# Patient Record
Sex: MALE | Race: WHITE | NOT HISPANIC OR LATINO | Employment: OTHER | URBAN - METROPOLITAN AREA
[De-identification: names, ages, dates, MRNs, and addresses within clinical notes are randomized per-mention and may not be internally consistent; named-entity substitution may affect disease eponyms.]

---

## 2023-03-26 PROBLEM — R22.9 SKIN NODULE: Status: ACTIVE | Noted: 2023-03-26

## 2023-03-26 PROBLEM — I48.91 AFIB (MULTI): Status: ACTIVE | Noted: 2023-03-26

## 2023-03-26 PROBLEM — I10 HYPERTENSION: Status: ACTIVE | Noted: 2023-03-26

## 2023-03-26 PROBLEM — M54.2 NECK PAIN: Status: ACTIVE | Noted: 2023-03-26

## 2023-03-26 PROBLEM — R73.03 PREDIABETES: Status: ACTIVE | Noted: 2023-03-26

## 2023-03-26 PROBLEM — G89.29 CHRONIC BACK PAIN: Status: ACTIVE | Noted: 2023-03-26

## 2023-03-26 PROBLEM — R20.2 NUMBNESS AND TINGLING: Status: ACTIVE | Noted: 2023-03-26

## 2023-03-26 PROBLEM — R91.8 LUNG NODULE, MULTIPLE: Status: ACTIVE | Noted: 2023-03-26

## 2023-03-26 PROBLEM — I71.20 THORACIC AORTIC ANEURYSM WITHOUT RUPTURE (CMS-HCC): Status: ACTIVE | Noted: 2023-03-26

## 2023-03-26 PROBLEM — F17.201 TOBACCO DEPENDENCE IN REMISSION: Status: ACTIVE | Noted: 2023-03-26

## 2023-03-26 PROBLEM — R20.0 NUMBNESS AND TINGLING: Status: ACTIVE | Noted: 2023-03-26

## 2023-03-26 PROBLEM — E55.9 VITAMIN D DEFICIENCY: Status: ACTIVE | Noted: 2023-03-26

## 2023-03-26 PROBLEM — N40.0 PROSTATIC HYPERTROPHY: Status: ACTIVE | Noted: 2023-03-26

## 2023-03-26 PROBLEM — M54.9 CHRONIC BACK PAIN: Status: ACTIVE | Noted: 2023-03-26

## 2023-03-26 PROBLEM — E78.5 HLD (HYPERLIPIDEMIA): Status: ACTIVE | Noted: 2023-03-26

## 2023-03-26 PROBLEM — Z86.73 HISTORY OF TIA (TRANSIENT ISCHEMIC ATTACK): Status: ACTIVE | Noted: 2023-03-26

## 2023-03-26 PROBLEM — B02.9 SHINGLES: Status: ACTIVE | Noted: 2023-03-26

## 2023-03-26 PROBLEM — E04.1 THYROID NODULE: Status: ACTIVE | Noted: 2023-03-26

## 2023-03-26 PROBLEM — B07.9 VIRAL WARTS: Status: ACTIVE | Noted: 2023-03-26

## 2023-03-26 RX ORDER — METOPROLOL TARTRATE 50 MG/1
1 TABLET ORAL 2 TIMES DAILY
COMMUNITY
Start: 2022-02-14 | End: 2024-03-11 | Stop reason: SDUPTHER

## 2023-03-26 RX ORDER — ASPIRIN 81 MG/1
1 TABLET ORAL DAILY
COMMUNITY
Start: 2017-09-19

## 2023-03-26 RX ORDER — OLMESARTAN MEDOXOMIL AND HYDROCHLOROTHIAZIDE 40/25 40; 25 MG/1; MG/1
1 TABLET ORAL DAILY
COMMUNITY
Start: 2022-03-09 | End: 2023-10-02 | Stop reason: SDUPTHER

## 2023-03-26 RX ORDER — SIMVASTATIN 40 MG/1
1 TABLET, FILM COATED ORAL NIGHTLY
COMMUNITY
Start: 2017-09-19 | End: 2024-03-11 | Stop reason: SDUPTHER

## 2023-03-31 ENCOUNTER — OFFICE VISIT (OUTPATIENT)
Dept: PRIMARY CARE | Facility: CLINIC | Age: 68
End: 2023-03-31
Payer: MEDICARE

## 2023-03-31 VITALS
HEIGHT: 71 IN | WEIGHT: 231 LBS | SYSTOLIC BLOOD PRESSURE: 120 MMHG | HEART RATE: 96 BPM | BODY MASS INDEX: 32.34 KG/M2 | DIASTOLIC BLOOD PRESSURE: 86 MMHG | OXYGEN SATURATION: 96 %

## 2023-03-31 DIAGNOSIS — I10 HYPERTENSION, UNSPECIFIED TYPE: ICD-10-CM

## 2023-03-31 DIAGNOSIS — E55.9 VITAMIN D DEFICIENCY: ICD-10-CM

## 2023-03-31 DIAGNOSIS — R73.03 PREDIABETES: ICD-10-CM

## 2023-03-31 DIAGNOSIS — I48.91 ATRIAL FIBRILLATION, UNSPECIFIED TYPE (MULTI): Primary | ICD-10-CM

## 2023-03-31 DIAGNOSIS — E04.1 THYROID NODULE: ICD-10-CM

## 2023-03-31 DIAGNOSIS — E78.5 HYPERLIPIDEMIA, UNSPECIFIED HYPERLIPIDEMIA TYPE: ICD-10-CM

## 2023-03-31 DIAGNOSIS — Z12.5 ENCOUNTER FOR SCREENING FOR MALIGNANT NEOPLASM OF PROSTATE: ICD-10-CM

## 2023-03-31 PROCEDURE — 3074F SYST BP LT 130 MM HG: CPT | Performed by: NURSE PRACTITIONER

## 2023-03-31 PROCEDURE — 99387 INIT PM E/M NEW PAT 65+ YRS: CPT | Performed by: NURSE PRACTITIONER

## 2023-03-31 PROCEDURE — 3079F DIAST BP 80-89 MM HG: CPT | Performed by: NURSE PRACTITIONER

## 2023-03-31 PROCEDURE — 1036F TOBACCO NON-USER: CPT | Performed by: NURSE PRACTITIONER

## 2023-03-31 PROCEDURE — 1159F MED LIST DOCD IN RCRD: CPT | Performed by: NURSE PRACTITIONER

## 2023-03-31 ASSESSMENT — ENCOUNTER SYMPTOMS
HEADACHES: 0
SHORTNESS OF BREATH: 0
FATIGUE: 0
SORE THROAT: 0
ABDOMINAL PAIN: 0
DIZZINESS: 0
CHILLS: 0
DIARRHEA: 0
VOMITING: 0
NUMBNESS: 0
FEVER: 0
CONSTIPATION: 0
MUSCULOSKELETAL NEGATIVE: 1
PALPITATIONS: 0
COUGH: 0
PSYCHIATRIC NEGATIVE: 1
NAUSEA: 0
WEAKNESS: 0

## 2023-03-31 ASSESSMENT — PATIENT HEALTH QUESTIONNAIRE - PHQ9
2. FEELING DOWN, DEPRESSED OR HOPELESS: NOT AT ALL
1. LITTLE INTEREST OR PLEASURE IN DOING THINGS: NOT AT ALL
SUM OF ALL RESPONSES TO PHQ9 QUESTIONS 1 AND 2: 0

## 2023-03-31 NOTE — PROGRESS NOTES
"Subjective   Patient ID: Raheem Washington is a 67 y.o. male who presents to establish care as a new patient.    HPI   Patient here today to establish care with new provider   has 3 grown children and grandchildren  Cares for her 91-year-old mother  Remains active, swimming, shooting guns, dirt bikes..  Has a history of A-fib that is remained stable and he follows up with cardiology regularly.  Blood pressure is stable  Takes medications as prescribed  Denies chest pain, SOB, palpitations, dizziness,  or GI issues.        Review of Systems   Constitutional:  Negative for chills, fatigue and fever.   HENT:  Negative for congestion, ear pain and sore throat.    Eyes:  Negative for visual disturbance.   Respiratory:  Negative for cough and shortness of breath.    Cardiovascular:  Negative for chest pain, palpitations and leg swelling.   Gastrointestinal:  Negative for abdominal pain, constipation, diarrhea, nausea and vomiting.   Genitourinary: Negative.    Musculoskeletal: Negative.    Skin:  Negative for rash.   Neurological:  Negative for dizziness, weakness, numbness and headaches.   Psychiatric/Behavioral: Negative.         Objective   /86   Pulse 96   Ht 1.803 m (5' 11\")   Wt 105 kg (231 lb)   SpO2 96%   BMI 32.22 kg/m²     Physical Exam  Constitutional:       General: He is not in acute distress.     Appearance: Normal appearance.   HENT:      Head: Normocephalic and atraumatic.      Right Ear: Tympanic membrane, ear canal and external ear normal.      Left Ear: Tympanic membrane, ear canal and external ear normal.      Nose: Nose normal.      Mouth/Throat:      Mouth: Mucous membranes are moist.      Pharynx: Oropharynx is clear.   Eyes:      Extraocular Movements: Extraocular movements intact.      Conjunctiva/sclera: Conjunctivae normal.      Pupils: Pupils are equal, round, and reactive to light.   Cardiovascular:      Rate and Rhythm: Normal rate and regular rhythm.      Pulses: Normal " pulses.      Heart sounds: Normal heart sounds. No murmur heard.  Pulmonary:      Effort: Pulmonary effort is normal.      Breath sounds: Normal breath sounds. No wheezing, rhonchi or rales.   Abdominal:      General: Bowel sounds are normal.      Palpations: Abdomen is soft.      Tenderness: There is no abdominal tenderness.   Musculoskeletal:         General: Normal range of motion.      Cervical back: Normal range of motion and neck supple.   Lymphadenopathy:      Comments: No lymphadenopathy noted   Skin:     General: Skin is warm and dry.      Findings: No rash.   Neurological:      General: No focal deficit present.      Mental Status: He is alert and oriented to person, place, and time.      Cranial Nerves: No cranial nerve deficit.      Coordination: Coordination normal.      Gait: Gait normal.   Psychiatric:         Mood and Affect: Mood normal.         Behavior: Behavior normal.         Assessment/Plan

## 2023-03-31 NOTE — ASSESSMENT & PLAN NOTE
Continue Benicar 40-25 mg daily  Monitor and record blood pressures at home, goal blood pressure 130/80

## 2023-04-03 ENCOUNTER — LAB (OUTPATIENT)
Dept: LAB | Facility: LAB | Age: 68
End: 2023-04-03
Payer: MEDICARE

## 2023-04-03 DIAGNOSIS — E55.9 VITAMIN D DEFICIENCY: ICD-10-CM

## 2023-04-03 DIAGNOSIS — I48.91 ATRIAL FIBRILLATION, UNSPECIFIED TYPE (MULTI): ICD-10-CM

## 2023-04-03 DIAGNOSIS — I10 HYPERTENSION, UNSPECIFIED TYPE: ICD-10-CM

## 2023-04-03 DIAGNOSIS — E04.1 THYROID NODULE: ICD-10-CM

## 2023-04-03 DIAGNOSIS — E78.5 HYPERLIPIDEMIA, UNSPECIFIED HYPERLIPIDEMIA TYPE: ICD-10-CM

## 2023-04-03 DIAGNOSIS — Z12.5 ENCOUNTER FOR SCREENING FOR MALIGNANT NEOPLASM OF PROSTATE: ICD-10-CM

## 2023-04-03 LAB
ALANINE AMINOTRANSFERASE (SGPT) (U/L) IN SER/PLAS: 29 U/L (ref 10–52)
ALBUMIN (G/DL) IN SER/PLAS: 4.2 G/DL (ref 3.4–5)
ALKALINE PHOSPHATASE (U/L) IN SER/PLAS: 64 U/L (ref 33–136)
ANION GAP IN SER/PLAS: 13 MMOL/L (ref 10–20)
ASPARTATE AMINOTRANSFERASE (SGOT) (U/L) IN SER/PLAS: 18 U/L (ref 9–39)
BASOPHILS (10*3/UL) IN BLOOD BY AUTOMATED COUNT: 0.07 X10E9/L (ref 0–0.1)
BASOPHILS/100 LEUKOCYTES IN BLOOD BY AUTOMATED COUNT: 1.3 % (ref 0–2)
BILIRUBIN TOTAL (MG/DL) IN SER/PLAS: 0.6 MG/DL (ref 0–1.2)
CALCIUM (MG/DL) IN SER/PLAS: 9 MG/DL (ref 8.6–10.3)
CARBON DIOXIDE, TOTAL (MMOL/L) IN SER/PLAS: 29 MMOL/L (ref 21–32)
CHLORIDE (MMOL/L) IN SER/PLAS: 102 MMOL/L (ref 98–107)
CHOLESTEROL (MG/DL) IN SER/PLAS: 151 MG/DL (ref 0–199)
CHOLESTEROL IN HDL (MG/DL) IN SER/PLAS: 41.7 MG/DL
CHOLESTEROL/HDL RATIO: 3.6
CREATININE (MG/DL) IN SER/PLAS: 1.05 MG/DL (ref 0.5–1.3)
EOSINOPHILS (10*3/UL) IN BLOOD BY AUTOMATED COUNT: 0.19 X10E9/L (ref 0–0.7)
EOSINOPHILS/100 LEUKOCYTES IN BLOOD BY AUTOMATED COUNT: 3.4 % (ref 0–6)
ERYTHROCYTE DISTRIBUTION WIDTH (RATIO) BY AUTOMATED COUNT: 12.9 % (ref 11.5–14.5)
ERYTHROCYTE MEAN CORPUSCULAR HEMOGLOBIN CONCENTRATION (G/DL) BY AUTOMATED: 32.6 G/DL (ref 32–36)
ERYTHROCYTE MEAN CORPUSCULAR VOLUME (FL) BY AUTOMATED COUNT: 97 FL (ref 80–100)
ERYTHROCYTES (10*6/UL) IN BLOOD BY AUTOMATED COUNT: 5.12 X10E12/L (ref 4.5–5.9)
GFR MALE: 78 ML/MIN/1.73M2
GLUCOSE (MG/DL) IN SER/PLAS: 102 MG/DL (ref 74–99)
HEMATOCRIT (%) IN BLOOD BY AUTOMATED COUNT: 49.7 % (ref 41–52)
HEMOGLOBIN (G/DL) IN BLOOD: 16.2 G/DL (ref 13.5–17.5)
IMMATURE GRANULOCYTES/100 LEUKOCYTES IN BLOOD BY AUTOMATED COUNT: 0.4 % (ref 0–0.9)
LDL: 72 MG/DL (ref 0–99)
LEUKOCYTES (10*3/UL) IN BLOOD BY AUTOMATED COUNT: 5.5 X10E9/L (ref 4.4–11.3)
LYMPHOCYTES (10*3/UL) IN BLOOD BY AUTOMATED COUNT: 1.87 X10E9/L (ref 1.2–4.8)
LYMPHOCYTES/100 LEUKOCYTES IN BLOOD BY AUTOMATED COUNT: 33.9 % (ref 13–44)
MONOCYTES (10*3/UL) IN BLOOD BY AUTOMATED COUNT: 0.56 X10E9/L (ref 0.1–1)
MONOCYTES/100 LEUKOCYTES IN BLOOD BY AUTOMATED COUNT: 10.2 % (ref 2–10)
NEUTROPHILS (10*3/UL) IN BLOOD BY AUTOMATED COUNT: 2.8 X10E9/L (ref 1.2–7.7)
NEUTROPHILS/100 LEUKOCYTES IN BLOOD BY AUTOMATED COUNT: 50.8 % (ref 40–80)
PLATELETS (10*3/UL) IN BLOOD AUTOMATED COUNT: 194 X10E9/L (ref 150–450)
POTASSIUM (MMOL/L) IN SER/PLAS: 3.7 MMOL/L (ref 3.5–5.3)
PROTEIN TOTAL: 6.9 G/DL (ref 6.4–8.2)
SODIUM (MMOL/L) IN SER/PLAS: 140 MMOL/L (ref 136–145)
THYROTROPIN (MIU/L) IN SER/PLAS BY DETECTION LIMIT <= 0.05 MIU/L: 3.26 MIU/L (ref 0.44–3.98)
TRIGLYCERIDE (MG/DL) IN SER/PLAS: 187 MG/DL (ref 0–149)
UREA NITROGEN (MG/DL) IN SER/PLAS: 21 MG/DL (ref 6–23)
VLDL: 37 MG/DL (ref 0–40)

## 2023-04-03 PROCEDURE — 84443 ASSAY THYROID STIM HORMONE: CPT

## 2023-04-03 PROCEDURE — 85025 COMPLETE CBC W/AUTO DIFF WBC: CPT

## 2023-04-03 PROCEDURE — 80053 COMPREHEN METABOLIC PANEL: CPT

## 2023-04-03 PROCEDURE — 36415 COLL VENOUS BLD VENIPUNCTURE: CPT

## 2023-04-03 PROCEDURE — 80061 LIPID PANEL: CPT

## 2023-04-03 PROCEDURE — 82306 VITAMIN D 25 HYDROXY: CPT

## 2023-04-03 PROCEDURE — 84153 ASSAY OF PSA TOTAL: CPT

## 2023-04-04 LAB
CALCIDIOL (25 OH VITAMIN D3) (NG/ML) IN SER/PLAS: 27 NG/ML
PROSTATE SPECIFIC ANTIGEN,SCREEN: 1.53 NG/ML (ref 0–4)

## 2023-04-06 ENCOUNTER — TELEPHONE (OUTPATIENT)
Dept: PRIMARY CARE | Facility: CLINIC | Age: 68
End: 2023-04-06
Payer: MEDICARE

## 2023-04-06 NOTE — TELEPHONE ENCOUNTER
Attempt to reach patient to discuss lab results.  Incorrect phone number, call does not go through

## 2023-10-02 ENCOUNTER — OFFICE VISIT (OUTPATIENT)
Dept: PRIMARY CARE | Facility: CLINIC | Age: 68
End: 2023-10-02
Payer: MEDICARE

## 2023-10-02 VITALS
SYSTOLIC BLOOD PRESSURE: 148 MMHG | DIASTOLIC BLOOD PRESSURE: 85 MMHG | BODY MASS INDEX: 31.78 KG/M2 | WEIGHT: 227 LBS | OXYGEN SATURATION: 96 % | HEIGHT: 71 IN | HEART RATE: 70 BPM

## 2023-10-02 DIAGNOSIS — I10 HYPERTENSION, UNSPECIFIED TYPE: ICD-10-CM

## 2023-10-02 DIAGNOSIS — Z00.00 MEDICARE ANNUAL WELLNESS VISIT, SUBSEQUENT: Primary | ICD-10-CM

## 2023-10-02 DIAGNOSIS — I71.20 THORACIC AORTIC ANEURYSM WITHOUT RUPTURE, UNSPECIFIED PART (CMS-HCC): ICD-10-CM

## 2023-10-02 DIAGNOSIS — E78.5 HYPERLIPIDEMIA, UNSPECIFIED HYPERLIPIDEMIA TYPE: ICD-10-CM

## 2023-10-02 DIAGNOSIS — Z00.00 ROUTINE GENERAL MEDICAL EXAMINATION AT HEALTH CARE FACILITY: ICD-10-CM

## 2023-10-02 DIAGNOSIS — I48.91 ATRIAL FIBRILLATION, UNSPECIFIED TYPE (MULTI): ICD-10-CM

## 2023-10-02 PROCEDURE — 3079F DIAST BP 80-89 MM HG: CPT | Performed by: NURSE PRACTITIONER

## 2023-10-02 PROCEDURE — 3077F SYST BP >= 140 MM HG: CPT | Performed by: NURSE PRACTITIONER

## 2023-10-02 PROCEDURE — 1159F MED LIST DOCD IN RCRD: CPT | Performed by: NURSE PRACTITIONER

## 2023-10-02 PROCEDURE — 1160F RVW MEDS BY RX/DR IN RCRD: CPT | Performed by: NURSE PRACTITIONER

## 2023-10-02 PROCEDURE — G0008 ADMIN INFLUENZA VIRUS VAC: HCPCS | Performed by: NURSE PRACTITIONER

## 2023-10-02 PROCEDURE — 1170F FXNL STATUS ASSESSED: CPT | Performed by: NURSE PRACTITIONER

## 2023-10-02 PROCEDURE — 99213 OFFICE O/P EST LOW 20 MIN: CPT | Performed by: NURSE PRACTITIONER

## 2023-10-02 PROCEDURE — 1036F TOBACCO NON-USER: CPT | Performed by: NURSE PRACTITIONER

## 2023-10-02 PROCEDURE — 90662 IIV NO PRSV INCREASED AG IM: CPT | Performed by: NURSE PRACTITIONER

## 2023-10-02 PROCEDURE — G0439 PPPS, SUBSEQ VISIT: HCPCS | Performed by: NURSE PRACTITIONER

## 2023-10-02 RX ORDER — OLMESARTAN MEDOXOMIL AND HYDROCHLOROTHIAZIDE 40/25 40; 25 MG/1; MG/1
1 TABLET ORAL DAILY
Qty: 90 TABLET | Refills: 1 | Status: SHIPPED | OUTPATIENT
Start: 2023-10-02 | End: 2024-03-11 | Stop reason: SDUPTHER

## 2023-10-02 ASSESSMENT — ENCOUNTER SYMPTOMS
NAUSEA: 0
SHORTNESS OF BREATH: 0
NUMBNESS: 0
FATIGUE: 0
VOMITING: 0
FEVER: 0
COUGH: 0
PSYCHIATRIC NEGATIVE: 1
HEADACHES: 0
DIZZINESS: 0
SORE THROAT: 0
MUSCULOSKELETAL NEGATIVE: 1
ABDOMINAL PAIN: 0
CONSTIPATION: 0
DIARRHEA: 0
CHILLS: 0
PALPITATIONS: 0
WEAKNESS: 0

## 2023-10-02 ASSESSMENT — ACTIVITIES OF DAILY LIVING (ADL)
MANAGING_FINANCES: INDEPENDENT
DOING_HOUSEWORK: INDEPENDENT
DRESSING: INDEPENDENT
GROCERY_SHOPPING: INDEPENDENT
TAKING_MEDICATION: INDEPENDENT
BATHING: INDEPENDENT

## 2023-10-02 NOTE — ASSESSMENT & PLAN NOTE
Continue olmesartan hydrochlorothiazide 40-25 milligrams daily  Monitor blood pressure at home, goal blood pressure 135/85 consistently

## 2023-10-02 NOTE — PROGRESS NOTES
"Subjective   Reason for Visit: Raheem Washington is an 67 y.o. male here for a Medicare Wellness visit.          Reviewed all medications by prescribing practitioner or clinical pharmacist (such as prescriptions, OTCs, herbal therapies and supplements) and documented in the medical record.    HPI  Overall, doing well.  Denies chest pain, SOB, palpitations, dizziness,  or GI issues.  Follows up with cardiology and electrophysiology for A-fib  Takes medications as prescribed.      Patient Care Team:  MANUEL Arora as PCP - General  MANUEL Arora as PCP - [employer] PCP     Review of Systems   Constitutional:  Negative for chills, fatigue and fever.   HENT:  Negative for congestion, ear pain and sore throat.    Eyes:  Negative for visual disturbance.   Respiratory:  Negative for cough and shortness of breath.    Cardiovascular:  Negative for chest pain, palpitations and leg swelling.   Gastrointestinal:  Negative for abdominal pain, constipation, diarrhea, nausea and vomiting.   Genitourinary: Negative.    Musculoskeletal: Negative.    Skin:  Negative for rash.   Neurological:  Negative for dizziness, weakness, numbness and headaches.   Psychiatric/Behavioral: Negative.         Objective   Vitals:  /85   Pulse 70   Ht 1.803 m (5' 11\")   Wt 103 kg (227 lb)   SpO2 96%   BMI 31.66 kg/m²       Physical Exam  Constitutional:       General: He is not in acute distress.     Appearance: Normal appearance.   HENT:      Head: Normocephalic and atraumatic.      Right Ear: Tympanic membrane, ear canal and external ear normal.      Left Ear: Tympanic membrane, ear canal and external ear normal.      Nose: Nose normal.      Mouth/Throat:      Mouth: Mucous membranes are moist.      Pharynx: Oropharynx is clear.   Eyes:      Extraocular Movements: Extraocular movements intact.      Conjunctiva/sclera: Conjunctivae normal.      Pupils: Pupils are equal, round, and reactive to " light.   Cardiovascular:      Rate and Rhythm: Normal rate and regular rhythm.      Pulses: Normal pulses.      Heart sounds: Normal heart sounds. No murmur heard.  Pulmonary:      Effort: Pulmonary effort is normal.      Breath sounds: Normal breath sounds. No wheezing, rhonchi or rales.   Abdominal:      General: Bowel sounds are normal.      Palpations: Abdomen is soft.      Tenderness: There is no abdominal tenderness.   Musculoskeletal:         General: Normal range of motion.      Cervical back: Normal range of motion and neck supple.   Lymphadenopathy:      Comments: No lymphadenopathy noted   Skin:     General: Skin is warm and dry.      Findings: No rash.   Neurological:      General: No focal deficit present.      Mental Status: He is alert and oriented to person, place, and time.      Cranial Nerves: No cranial nerve deficit.      Coordination: Coordination normal.      Gait: Gait normal.   Psychiatric:         Mood and Affect: Mood normal.         Behavior: Behavior normal.         Assessment/Plan   Problem List Items Addressed This Visit       Afib (CMS/[facility])    Current Assessment & Plan     Continue Eliquis, aspirin and metoprolol as prescribed by cardiology and electrophysiology         HLD (hyperlipidemia)    Current Assessment & Plan     Continue simvastatin as prescribed         Hypertension    Current Assessment & Plan     Continue olmesartan hydrochlorothiazide 40-25 milligrams daily  Monitor blood pressure at home, goal blood pressure 135/85 consistently         Relevant Medications    olmesartan-hydrochlorothiazide (BENIcar HCT) 40-25 mg tablet    Thoracic aortic aneurysm without rupture (CMS/[facility])     Other Visit Diagnoses       Medicare annual wellness visit, subsequent    -  Primary    Routine general medical examination at health care facility            Follow-up in 6 months we will get yearly labs at that time

## 2023-11-30 NOTE — PROGRESS NOTES
Electrophysiology Follow up Visit    Raheem Washington is a 68 y.o. year old male patient with    1. Hypertension- slightly elevated today     2. Stroke January 2022. Received TPA. No residual effects from stroke     3. Atrial fibrillation- first diagnosed end of January 2022 when patient presented with stroke. TPA given. Echocardiogram at that time showed LV function 45-50% with normal left atrium. Patient underwent DCCV 3/3/2022 though thinks he went back into AF a few days later. ECG on 3/20 showed AF. Patient s/p PVI ablation 6/28/2022.      Patient here for follow-up for atrial fibrillation.  He reports he has been feeling well with no palpitations though was relatively asymptomatic with atrial fibrillation in the past.  He continues to wear her smart watch with no alerts of atrial fibrillation or tachycardia.  He has followed up with sleep medicine and has begun to use CPAP which he is adjusting to.  He continues to take Eliquis with no bleeding concerns.       Medical History  He has a past medical history of Cervicalgia (06/14/2021) and Other specified abnormal findings of blood chemistry (06/25/2020).    Social History   reports that he has never smoked. He has never used smokeless tobacco. He reports current alcohol use of about 5.0 standard drinks of alcohol per week. He reports that he does not use drugs.      FamHx:   Family History   Problem Relation Name Age of Onset    Hyperlipidemia Mother      Hypertension Mother      Hyperlipidemia Father      Hypertension Father      Other (cardiac disorder) Father         No Known Allergies     Outpatient Medications:  Current Outpatient Medications   Medication Instructions    apixaban (Eliquis) 5 mg tablet 1 tablet, oral, 2 times daily    aspirin 81 mg EC tablet 1 tablet, oral, Daily    metoprolol tartrate (Lopressor) 50 mg tablet 1 tablet, oral, 2 times daily    olmesartan-hydrochlorothiazide (BENIcar HCT) 40-25 mg tablet 1 tablet, oral, Daily    simvastatin  "(Zocor) 40 mg tablet 1 tablet, oral, Nightly         Last Recorded Vitals: .vital      6/28/2022     9:02 AM 8/9/2022    10:58 AM 10/13/2022     1:44 PM 3/13/2023    10:22 AM 3/31/2023    11:59 AM 10/2/2023     8:49 AM 12/5/2023     8:02 AM   Vitals   Systolic 125 126 126 144 120 148 148   Diastolic 94 82 84 86 86 85 90   Heart Rate 130 81 91 83 96 70 75   Resp 15         Height (in)  1.778 m (5' 10\") 1.778 m (5' 10\") 1.778 m (5' 10\") 1.803 m (5' 11\") 1.803 m (5' 11\")    Weight (lb)  223.32 222.66 226.19 231 227 232.14   BMI  32.04 kg/m2 31.95 kg/m2 32.46 kg/m2 32.22 kg/m2 31.66 kg/m2 32.38 kg/m2   BSA (m2)  2.23 m2 2.23 m2 2.26 m2 2.29 m2 2.27 m2 2.29 m2   Visit Report     Report Report Report    Visit Vitals  /90   Pulse 75   Wt 105 kg (232 lb 2.3 oz)   SpO2 95%   BMI 32.38 kg/m²   Smoking Status Never   BSA 2.29 m²        Physical Exam  Constitutional:       Appearance: Normal appearance.   Eyes:      Pupils: Pupils are equal, round, and reactive to light.   Cardiovascular:      Rate and Rhythm: Normal rate and regular rhythm.      Heart sounds: Normal heart sounds.   Pulmonary:      Effort: Pulmonary effort is normal.      Breath sounds: Normal breath sounds.   Musculoskeletal:         General: Normal range of motion.   Skin:     General: Skin is warm and dry.   Neurological:      Mental Status: He is alert and oriented to person, place, and time.      My Interpretation of Reviewed Study(s):  Echo(January/2022):   CONCLUSIONS:   1. The left ventricular systolic function is mildly decreased with a 45-50% estimated ejection fraction.   2. Spectral Doppler shows an abnormal pattern of left ventricular diastolic filling.   3. There is mild aortic valve regurgitation.   4. There is mild mitral and tricuspid regurgitation.   5. The patient is in atrial fibrillation which may influence the estimate of left ventricular function and transvalvular flows.   6. There is no evidence of a patent foramen ovale.  ECGs " (reviewed and my interpretation):   12/5/2023 sinus rhythm with rate of 74 bpm    Lab Results   Component Value Date    WBC 5.5 04/03/2023    HGB 16.2 04/03/2023    HCT 49.7 04/03/2023     04/03/2023    ALT 29 04/03/2023    AST 18 04/03/2023     04/03/2023    K 3.7 04/03/2023     04/03/2023    CREATININE 1.05 04/03/2023    BUN 21 04/03/2023    CO2 29 04/03/2023    TSH 3.26 04/03/2023    INR 1.3 (H) 06/25/2022       Assessment  Atrial fibrillation: Ablation in 2022.  ECG today showing sinus rhythm.  Patient maintaining sinus rhythm since ablation.  Will continue metoprolol.  Will continue OAC indefinitely for elevated [id number] score.  ALEXANDRE: severe ALEXANDRE. CPAP ordered per sleep medicine. Encouraged patient compliance  Hypertension: slightly elevated today. Continue current medications and monitor BP at home. Managed by PCP    PLAN  Continue metoprolol and Eliquis  Follow-up late summer      Exclusive of any other services or procedures performed, IGina APRN-CNP , spent 30 minutes in duration for this visit today.  This time consisted of chart review, obtaining history, and/or performing the exam as documented above as well as documenting the clinical information for the encounter in the electronic record, discussing treatment options, plans, and/or goals with patient, family, and/or caregiver, refilling medications, updating the electronic record, ordering medicines, lab work, imaging, referrals, and/or procedures as documented above and communicating with other [facility] professionals. I have discussed the results of laboratory, radiology, and cardiology studies with the patient and their family/caregiver.

## 2023-12-05 ENCOUNTER — OFFICE VISIT (OUTPATIENT)
Dept: CARDIOLOGY | Facility: HOSPITAL | Age: 68
End: 2023-12-05
Payer: MEDICARE

## 2023-12-05 VITALS
OXYGEN SATURATION: 95 % | WEIGHT: 232.14 LBS | DIASTOLIC BLOOD PRESSURE: 90 MMHG | BODY MASS INDEX: 32.38 KG/M2 | HEART RATE: 75 BPM | SYSTOLIC BLOOD PRESSURE: 148 MMHG

## 2023-12-05 DIAGNOSIS — I10 HYPERTENSION, UNSPECIFIED TYPE: ICD-10-CM

## 2023-12-05 DIAGNOSIS — I48.0 PAROXYSMAL ATRIAL FIBRILLATION (MULTI): Primary | ICD-10-CM

## 2023-12-05 LAB
ATRIAL RATE: 74 BPM
P AXIS: 37 DEGREES
P OFFSET: 190 MS
P ONSET: 132 MS
PR INTERVAL: 186 MS
Q ONSET: 225 MS
QRS COUNT: 12 BEATS
QRS DURATION: 82 MS
QT INTERVAL: 376 MS
QTC CALCULATION(BAZETT): 417 MS
QTC FREDERICIA: 403 MS
R AXIS: 5 DEGREES
T AXIS: 21 DEGREES
T OFFSET: 413 MS
VENTRICULAR RATE: 74 BPM

## 2023-12-05 PROCEDURE — 3080F DIAST BP >= 90 MM HG: CPT | Performed by: NURSE PRACTITIONER

## 2023-12-05 PROCEDURE — 99214 OFFICE O/P EST MOD 30 MIN: CPT | Performed by: NURSE PRACTITIONER

## 2023-12-05 PROCEDURE — 93005 ELECTROCARDIOGRAM TRACING: CPT | Performed by: NURSE PRACTITIONER

## 2023-12-05 PROCEDURE — 1036F TOBACCO NON-USER: CPT | Performed by: NURSE PRACTITIONER

## 2023-12-05 PROCEDURE — 1160F RVW MEDS BY RX/DR IN RCRD: CPT | Performed by: NURSE PRACTITIONER

## 2023-12-05 PROCEDURE — 3077F SYST BP >= 140 MM HG: CPT | Performed by: NURSE PRACTITIONER

## 2023-12-05 PROCEDURE — 1159F MED LIST DOCD IN RCRD: CPT | Performed by: NURSE PRACTITIONER

## 2024-01-10 ENCOUNTER — OFFICE VISIT (OUTPATIENT)
Dept: SLEEP MEDICINE | Facility: CLINIC | Age: 69
End: 2024-01-10
Payer: MEDICARE

## 2024-01-10 VITALS
BODY MASS INDEX: 32.2 KG/M2 | DIASTOLIC BLOOD PRESSURE: 86 MMHG | RESPIRATION RATE: 16 BRPM | OXYGEN SATURATION: 94 % | WEIGHT: 230 LBS | HEART RATE: 81 BPM | HEIGHT: 71 IN | SYSTOLIC BLOOD PRESSURE: 124 MMHG

## 2024-01-10 DIAGNOSIS — I10 PRIMARY HYPERTENSION: ICD-10-CM

## 2024-01-10 DIAGNOSIS — I48.91 ATRIAL FIBRILLATION, UNSPECIFIED TYPE (MULTI): ICD-10-CM

## 2024-01-10 DIAGNOSIS — F51.04 CHRONIC INSOMNIA: ICD-10-CM

## 2024-01-10 DIAGNOSIS — G47.33 OSA ON CPAP: Primary | ICD-10-CM

## 2024-01-10 DIAGNOSIS — E66.9 OBESITY (BMI 30-39.9): ICD-10-CM

## 2024-01-10 PROCEDURE — 1160F RVW MEDS BY RX/DR IN RCRD: CPT | Performed by: INTERNAL MEDICINE

## 2024-01-10 PROCEDURE — 99214 OFFICE O/P EST MOD 30 MIN: CPT | Performed by: INTERNAL MEDICINE

## 2024-01-10 PROCEDURE — 1159F MED LIST DOCD IN RCRD: CPT | Performed by: INTERNAL MEDICINE

## 2024-01-10 PROCEDURE — 1126F AMNT PAIN NOTED NONE PRSNT: CPT | Performed by: INTERNAL MEDICINE

## 2024-01-10 PROCEDURE — 3079F DIAST BP 80-89 MM HG: CPT | Performed by: INTERNAL MEDICINE

## 2024-01-10 PROCEDURE — 3074F SYST BP LT 130 MM HG: CPT | Performed by: INTERNAL MEDICINE

## 2024-01-10 PROCEDURE — 1036F TOBACCO NON-USER: CPT | Performed by: INTERNAL MEDICINE

## 2024-01-10 ASSESSMENT — SLEEP AND FATIGUE QUESTIONNAIRES
HOW LIKELY ARE YOU TO NOD OFF OR FALL ASLEEP WHEN YOU ARE A PASSENGER IN A CAR FOR AN HOUR WITHOUT A BREAK: WOULD NEVER DOZE
HOW LIKELY ARE YOU TO NOD OFF OR FALL ASLEEP WHILE SITTING AND READING: WOULD NEVER DOZE
ESS-CHAD TOTAL SCORE: 0
SITING INACTIVE IN A PUBLIC PLACE LIKE A CLASS ROOM OR A MOVIE THEATER: WOULD NEVER DOZE
HOW LIKELY ARE YOU TO NOD OFF OR FALL ASLEEP WHILE SITTING QUIETLY AFTER LUNCH WITHOUT ALCOHOL: WOULD NEVER DOZE
HOW LIKELY ARE YOU TO NOD OFF OR FALL ASLEEP WHILE SITTING AND TALKING TO SOMEONE: WOULD NEVER DOZE
HOW LIKELY ARE YOU TO NOD OFF OR FALL ASLEEP WHILE LYING DOWN TO REST IN THE AFTERNOON WHEN CIRCUMSTANCES PERMIT: WOULD NEVER DOZE
HOW LIKELY ARE YOU TO NOD OFF OR FALL ASLEEP WHILE WATCHING TV: WOULD NEVER DOZE
HOW LIKELY ARE YOU TO NOD OFF OR FALL ASLEEP IN A CAR, WHILE STOPPED FOR A FEW MINUTES IN TRAFFIC: WOULD NEVER DOZE

## 2024-01-10 ASSESSMENT — PAIN SCALES - GENERAL: PAINLEVEL: 0-NO PAIN

## 2024-01-10 ASSESSMENT — PATIENT HEALTH QUESTIONNAIRE - PHQ9
2. FEELING DOWN, DEPRESSED OR HOPELESS: NOT AT ALL
SUM OF ALL RESPONSES TO PHQ9 QUESTIONS 1 AND 2: 0
1. LITTLE INTEREST OR PLEASURE IN DOING THINGS: NOT AT ALL

## 2024-01-10 NOTE — PROGRESS NOTES
[facility] SLEEP MEDICINE CLINIC  FOLLOW-UP VISIT NOTE    PCP: MANUEL Arora    HISTORY OF PRESENT ILLNESS     Patient ID: Raheem Washington is a 68 y.o. male who presents for follow-up of ALEXANDRE after new machine setup.     Patient is here alone today.  To review, patient's medical history is notable for obesity (BMI 32), HTN, atrial fibrillation, TIA, central sleep apnea, and ALEXANDRE on CPAP.     Interval History  Patient was last seen in 9/20/2023. Plan was to start PAP therapy.  Since last visit, patient has been using machine every night. Current mask interface being used is Airfit [id number] nasal mask. Per records, patient is getting supplies thru [employer]  Patient denies machine problems, air hunger, aerophagia, skin irritation, nasal congestion, and snoring on PAP.   Complains of dry mouth, mask leak, and sensation of pressure too much . Per patient, he felt that his cheeks swollen up with air when using CPAP which wakes him up from sleep.   The following are patient's perceived benefits of PAP: no snoring on PAP, refreshing sleep, decreased daytime sleepiness and/or fatigue, decreased nocturnal awakening, and sleeps longer.    RLS Follow-up:   Denies    SLEEP STUDY HISTORY (personally reviewed raw data such as interpretation report, data sheet, hypnogram, and titration table if available and applicable)  HSAT 4/30/2023: AHI3% 76.5, AHI4% 60.9, MYRTLE 34, central and mixed apneas 56%, SpO2 abram 80%, spent 83 mins with SpO2<89%  Split night PSG 9/11/2023: RDI3% 68.9, RDI4% 52.8, MYRTLE 17.7, SpO2 abram 82%, spent 6.6 mins with SpO2<89%. During diagnostic portion, most events were obstructive. There were several mixed apneas noted with longer central apnea portion. Both mixed and central apneas were occurring during sleep-wake transitions and post-arousals. Cheyne-Crenshaw breathing pattern and ataxic breathing pattern were not present. CPAP was started at 5-12 cm H2O. At CPAP 12 cm H2O  "with REM supine sleep, RDI 8.4, REM RDI 8.9, supine RDI 8.4, SpO2 abram 89%     SLEEP-WAKE SCHEDULE  Bedtime:  10 PM on weekdays, 12 MN on weekends  Subjective sleep latency: 1 hour to 1.5 hour due to mind-racing  Number of awakenings: 3 times for unknown reasons withou CPAP, 2 times per night due to cheeks blowing out from pressure on CPAP  Falls back asleep right away  Final wake time:  5-7 AM daily  Out of bed time:  5-7 AM daily  Shift work: No   Naps: 3-4 times per week for 1 hour before starting CPAP. No napping since on CPAP  Average sleep duration (excluding naps): 3 hours without CPAP, 6 hours with CPAP    SLEEP ENVIRONMENT  Sleep location: bed  Sleep status: sleeps alone  Preferred sleep position: back and side    SOCIAL HISTORY  Smoking: quit 9 years ago  ETOH: 2 beers per week  Marijuana: no  Caffeine: 2-5 cups decaf coffee daily  Sleep aids: no    WEIGHT: lost 6 lbs in 2 weeks     Claustrophobia: No     Active Problems, Allergy List, Medication List, and PMH/PSH/FH/Social Hx have been reviewed and reconciled in chart. No significant changes unless documented in the pertinent chart section. Updates made when necessary.     PHYSICAL EXAM     VITAL SIGNS: /86   Pulse 81   Resp 16   Ht 1.803 m (5' 11\")   Wt 104 kg (230 lb)   SpO2 94%   BMI 32.08 kg/m²     NECK CIRCUMFERENCE: 18.5 inches    Today ESS: 0  Last visit ESS: 1  KALEE: 13    Physical Exam  Constitutional: Awake, not in distress  Head: normocephalic, atraumatic  Skin: Warm, no rash  Neuro: No tremors, moves all extremities  Psych: alert and oriented to time, place, and person    RESULTS/DATA     No results found for: \"IRON\", \"TRANSFERRIN\", \"IRONSAT\", \"TIBC\", \"FERRITIN\"    Bicarbonate   Date Value Ref Range Status   04/03/2023 29 21 - 32 mmol/L Final       PAP Adherence  A PAP adherence data was obtained and reviewed personally today in clinic. (see scanned document in EPIC)      ASSESSMENT/PLAN     Raheem OLIVA Padmini is a 68 y.o. male who " returns in [facility] for the following problems:    SEVERE SLEEP APNEA with both obstructive and central features (HSAT AHI4% 60.9, MYRTLE 34 )  SLEEP-RELATED HYPOXEMIA  - Now on auto-CPAP 14 cm H2O and EPR 1 on ramp only  - Doing well with improved ALEXANDRE symptoms.   - PAP adherence data reviewed today. Usage days 25/30, days> 4 hours at 80%, residual AHI 2.6.   - Due to sensation of pressure too much, recommend changing settings to CPAP 10 cm H2O. Order sent to [facility]  - If residual AHI is still less than 5.0 and is better tolerated on next follow-up, consider doing sleep oximetry to evaluate for need of supplemental oxygen.   - Continue supportive management as follows: Lose weight. Stay off your back when sleeping. Avoid smoking, alcohol, and sedating medications if applicable. Don't drive when sleepy.    CHRONIC INSOMNIA, sleep onset and sleep maintenance  - Sleep-onset insomnia is likely due to poor sleep habits (rumination, variable wake times)  - Sleep maintenance insomnia is likely due to pressure issues from CPAP  - Discussed about stimulus control. The goal of stimulus control is to retrain the patient to associate bedtime and the bed/bedroom with successful sleep. Stimulus control techniques involve going to bed only when sleepy and not fatigued, restricting all non-sleep and non-intimacy activities from bed, getting out of bed if unable to fall asleep within roughly 15 minutes, and avoid napping.   - Tips for good sleep habits: Avoid blue light exposure at bedtime or when awake in the middle of the night. Avoid caffeine, smoking, large meal, exercise, alcohol, and technology near bedtime. Control environmental noise and temperature in the bedroom. May use and turn on white noise machine, humidifier, or fan while asleep. Avoid clock-watching or worrying if awake in bed. Allot a separate time to write down worries. Maintain a consistent awakening time (fixed  wake-up time) regardless of bedtime or sleep quality.    OBESITY  - BMI 32 today  - Recommend weight loss with diet and exercise.  - Weight loss can help in long term management of ALEXANDRE.  - Defer management to PCP    HYPERTENSION  - BP stable today.  - Continue anti-hypertensive medications per PCP.  - Supportive management: low salt DASH diet (less than 2000 mg sodium intake daily), moderate intensity aerobic exercise at least 30 minutes 5 days per week, reduce stress, quit smoking, limit alcohol, lose weight, and monitor BP once daily  - PCP following. Defer management to PCP    ATRIAL FIBRILLATION  - s/p DCCV 3/3/2022 and PVI ablation 6/28/2022   - Continue meds per Cardio  - Cardio following. Defer management to Cardio.    All of patient's questions were answered. He verbalizes understanding and agreement with my assessment and plan. Refer to after-visit-summary (AVS) for patient education and if applicable, for more details on troubleshooting issues with PAP usage, proper cleaning instructions of PAP supplies, and usual recommended replacement schedule for each of the PAP supplies.     Follow-up in 3 months

## 2024-01-11 NOTE — PATIENT INSTRUCTIONS
"Thank you for coming to the [facility] today! Your sleep medicine provider today was: Elena Gupta MD Below is a summary of your treatment plan, patient education, other important information, and our contact numbers.      TREATMENT PLAN     - Need to change machine settings. Order sent to DME. Please use your machine every night all night.   - Please read the \"Patient Education\" section below for more detailed information. Try implementing tips, reminders, strategies, and supportive management.   - If not yet done, please sign up for [employer] to make a future schedule, send prescription requests, or send messages.    Follow-up Appointment:   Follow-up in 3 months    PATIENT EDUCATION     Obstructive Sleep Apnea (ALEXANDRE) is a sleep disorder where your upper airway muscles relax during sleep and the airway intermittently and repetitively narrows and collapses leading to partially blocked airway (hypopnea) or completely blocked airway (apnea) which, in turn, can disrupt breathing in sleep, lower oxygen levels while you sleep and cause night time wakings. Because both apnea and hypopnea may cause higher carbon dioxide or low oxygen levels, untreated ALEXANDRE can lead to heart arrhythmia, elevation of blood pressure, and make it harder for the body to consolidate memory and facilitate metabolism (leading to higher blood sugars at night). Frequent partial arousals occur during sleep resulting in sleep deprivation and daytime sleepiness. ALEXANDRE is associated with an increased risk of cardiovascular disease, stroke, hypertension, and insulin resistance. Moreover, untreated ALEXANDRE with excessive daytime sleepiness can increase the risk of motor vehicular accidents.    Some conservative strategies for ALEXANDRE regardless of ALEXANDRE severity are:   Positional therapy - Avoid sleeping on your back.   Healthy diet and regular exercise to optimize weight is highly encouraged.   Avoid alcohol late in the evening and sedative-hypnotics as " these substances can make sleep apnea worse.   Improve breathing through the nose with intranasal steroid spray, saline rinse, or antihistamines    Safety: Avoid driving vehicle and operating heavy equipment while sleepy. Drowsy driving may lead to life-threatening motor vehicle accidents. A person driving while sleepy is 5 times more likely to have an accident. If you feel sleepy, pull over and take a short power nap (sleep for less than 30 minutes). Otherwise, ask somebody to drive you.    Treatment options for sleep apnea include weight management, positional therapy, Positive Airway Therapy (PAP) therapy, oral appliance therapy, hypoglossal nerve stimulator (Inspire) and select airway surgeries.    Maintaining your CPAP/BPAP device:    The humidification chamber (aka water tank or water chamber) needs to be filled with distilled water to prevent buildup of white deposits in the future. If you cannot find distilled water, you can use tap water but expect to have white deposits buildup seen after prolonged use with tap water. If you start seeing white deposits on the water chamber, you can clean it by filling it with equal parts of distilled white vinegar and water. Let the vinegar-water mixture sit for 2 hours, and then rinse it with running tap water. Clean with soap and water then let it dry.     You should try to keep your machine clean in order to work well. Here are some tips to clean PAP supplies / accessories:    Clean the humidification chamber (aka water tank) as well as your mask and tubing at least once a week with soap and water.   Alternatively, you can fill a sink or basin with warm water and add a little mild detergent, like Ivory dish soap. Gently wipe your supplies with the soapy water to free all the oils and dirt that may have collected. Once that's done, rinse these items with clean water until the soap is gone and let them air dry. You can hang your tubing over the curtain misael in your  bathroom so that it dries.  The mask insert (part of the mask that has contact with your skin) needs to be cleaned with soap and water daily. Another option is to wipe them down with CPAP wipes or baby wipes.    You should replace your mask and tubing frequently in order to prevent bacteria buildup, machine damage, and mask seal issues. The older the mask and hose, the high likelihood that there is bacteria buildup in it especially if they are not cleaned regularly. Dirty filters damage machines because build-up of dust and contaminants can cause machine to over-heat, and in time, damage the motor of machine. Cushions lose their seal over time as most masks are made of plastic and silicone while headgear is made of neoprene. These materials will break down with age and frequent use. Here is the recommended replacement schedule for PAP supplies / accessories:    Twice a month- disposable filters and cushions for nasal mask or nasal pillows.  Once a month- cushion for full face mask  Every 3 months- mask with headgear and PAP tubing (standard or heated hose)  Every 6 months- reusable filter, water chamber, and chin strap     Other useful information:    Some people do not put water in the tank while other people prefers to put water in the tank to prevent mouth dryness. Try to experiment to determine which is more comfortable for you.   In general, new machines have 2 years warranty on parts while health insurance allows you to have a new machine once every 5 years.     Common issues with PAP machine:    Mask gets dislodged when turning to the side: Consider getting a CPAP pillow or switching to a mask with hose on top.     Dry mouth:  Your machine has built-in humidifier that heats up the air to prevent dry mouth. It can be adjusted to your comfort. You can try that first and increase setting one level one night at a time to check which setting is comfortable and effective in lessening dry mouth. In some patients  "with heated hose, adjusting tube temperature to make air warmer can improve dry mouth. If dry mouth persists despite adjusting humidity or tube temperature setting, may apply OTC Biotene gel over the gums at bedtime.  If Biotene gel is not effective, consider trying XEROSTOM gel from [employer].  Also, eliminate or reduce dose of medications that can cause dry mouth if possible. Lastly, may try getting a separate room humidifier machine.    Airleaks: Please call DME as they may need to adjust your mask or refit you with a different kind or different size of mask. In addition, you can ask DME for tips on getting a good mask seal and mask fit.     Difficulty tolerating the mask: Contact your DME to try a different kind of mask and/or call office to get a referral to Sleep Psychologist for CPAP desensitization. CPAP desensitization technique is a set of strategies that helps patient cope with claustrophobia and anxiety related to wearing mask. Alternatively, we can do a daytime mini-sleep study called PAP-nap trial wherein you will try on different kinds of mask and the sleep technician will try different pressure settings on CPAP and BPAP machines to see which specific pressure is tolerable and comfortable for you.     Water droplets or moisture within the hose and/or mask: This is called rain-out and it is caused by condensation of too much heated humidity on the cooler walls of the hose. If you have rain-out, turn down humidity settings or get a heated hose. If you already have a heated hose, turn up the \"tube temperature\" of the heated hose. Alternatively, if you don't want to get a heated hose or warmer air, may wrap the CPAP hose with stockings to keep it somewhat warm. Also, you need to place the machine on the floor and lower the hose so that water won't travel upward towards your mask.     Insomnia, or trouble falling asleep or staying asleep, can be caused by many different things such as untreated sleep " "apnea, anxiety, depression, stress, poor sleep habits, and other medical conditions or medications. The best way to treat insomnia is to treat the cause. In general, we can all benefit from better sleep hygiene (aka good sleep habits). Some recommendations to help you improve your sleep so you can fall asleep, stay asleep, and wake up feeling refreshed include:    Keep a routine bedtime and rise time even on weekends and non-work days. This helps your biological clock stay in sync with your sleep needs. If you currently have variable sleep-wake schedule, start off by waking up and getting out of bed the same time every day, even on weekends or non-work days. Whether you have good or poor sleep, waking up at the same time every day can help re-train and reset your body clock.  Go to bed when you are sleepy and not when tired nor before your goal bedtime. Long periods of time in bed will lead to fragmented, shallow, broken sleep.   Use the bed for sleep and intimacy only. Do not watch TV, eat, read or use phone/laptop in bed. Keeping sleep as the only activity in bed will help re-associate bed equals sleep.  Get up when you cannot sleep in 20-30 minutes. When you are unable to sleep, exit the bed and go to another room or chair in bedroom, do something boring, calm, relaxing, distracting, or non-stimulating in dim lighting until you feel sleepy enough to fall back asleep. Avoid stimulating activity or any triggers for mental thinking (e.g. cellphone). Repeat as needed.  Avoid napping during the day to build up sleep pressure so that it won't be hard for you to fall asleep at night. Napping, particularly in the late afternoon or early evening may interfere with your night's sleep. If naps are necessary, limit naps under 15-20 minutes and not later than 3 PM.   Create a \"buffer zone\" or \"wind-down time\". This is a quiet time prior to bedtime, typically at least 30 minutes and perhaps as long as 1-2 hours. During this " time, you should do things that are enjoyable, relaxing, and not necessarily goal-oriented like performing relaxation exercises, listening to relaxing music, reading a boring book, dimming the lights, or eating a light bedtime snack like a glass of milk and banana which can promote sleep. Activities that promote relaxation before sleep is important because these can hep quiet the mind and relax the body to get into the right states for sleep.   Do not worry or plan in bed. If you are worrying, planning, feeling anxious, or cannot shut off your thoughts, get up and stay out of bed until you have quieted your mind. Set up a “scheduled worry time” in the morning or late afternoon to write down your worries and stressors as well as plans for the following day.   Keep your bedroom quiet, dark and cool. Ideal sleeping temperature is 65F. If light bothers you, get a slumber mask or blackout shades. If noise bothers you, put ear plugs or get a white noise machine. Alternatively, you may turn on fan or humidifier to create a constant background noise to eliminate unexpected sounds that would otherwise wake you up in the middle of your sleep.  Keep your bedroom technology free. Avoid TV and electronics 1-2 hours prior to bedtime. Also, turn the clock around to avoid clock-watching. Thoughts of the time ca cause frustration and make it more difficult to go to sleep.   DO NOT TRY TOO HARD TO SLEEP. JUST ALLOW SLEEP TO UNFOLD.  Other things to avoid to help  Avoid  caffeine (including chocolate) intake in the late afternoon and early evening. imit the amount of caffeine and consume before noon.   Avoid smoking 2-3 hours before bedtime. Like caffeine, nicotine in cigarette smoking acts a stimulant.   Avoid alcohol intake 2-3 hours before bedtime. Although alcohol may seem to help you fall asleep more easily as it slows down brain activity, it also disrupts your sleep during the night by causing frequent awakenings as the effect  of alcohol wears off. Also, alcohol worsens snoring and sleep apnea  Avoid eating a heavy meal (high-fat, high-carbohydrate, gas-producing foods) at dinner or 2-3 hours before bedtime.    Avoid drinking more than 8 oz liquids in the evening. Restrict fluids after dinner and void at bedtime.  Avoid physical exercise or hot shower / warm bath within 2 hours of bedtime. Regular exercise can improve sleep quality, but exercising or having a warm bath too close to bedtime can disrupt sleep onset. The best time to exercise to help sleep is in the late afternoon or early evening but not within 2 hours of bedtime. Warm baths can be taken in the evening but not within 2 hours of going to bed.   Avoid sleeping with pets or kids if they appear to bother your sleep and/or sleep quality.    MOST IMPORTANTLY:  BE PATIENT!  BE CONSISTENT!  Your sleep problem developed over time so it will take some time and consistency to return to a more normal sleep pattern. By following the suggestions listed above, you should see gradual sleep improvements over time.    Also you have been recommended to do Cognitive Behavioral Therapy for Insomnia (CBT-I). CBT-I is widely recognized as an effective treatment for a wide range of insomnias. CBT-I is typically made up of a number of components including assessment, behavioral and cognitive interventions, motivational techniques, and relapse prevention skills. An overwhelming amount of evidence suggests that CBT-I is as effective as hypnotics and the newer non-benzodiazepine sleep aides in the acute treatment and is more effective than medication in the long term treatment of insomnia.     Below are some resources for CBT-I:  CBT-I at  with Ciarra Jp, NP  GoToSleep- online course via [facility]. Self-guided. One time charge to sign up.  SleepEZ- Free self-guided course from the [facility] https://www.veterantraining.va.gov/insomnia/  Dr. Washington - one on one CBT-I service www.[email or web address]      You can  also go to the following EDUCATION WEBSITES for further information:   American Academy of Sleep Medicine http://sleepeducation.org  National Sleep Foundation: https://sleepfoundation.org  American Sleep Apnea Association: https://www.sleepapnea.org (for patients with sleep apnea)  Narcolepsy Network: https://www.narcolepsynetwork.org (for patients with narcolepsy)  [employer]: https://www.Skyhoodcolepsy.org (for patients with narcolepsy)  Hypersomnia Foundation: https://www.hypersomniafoundation.org (for patients with idiopathic hypersomnia)  RLS foundation: https://www.rls.org (for patients with restless leg syndrome)    IMPORTANT INFORMATION     Call 911 for medical emergencies.  Our offices are generally open from Monday-Friday, 8 am - 5 pm.   There are no supporting services by either the sleep doctors or their staff on weekends and Holidays, or after 5 PM on weekdays.   If you need to get in touch with me, you may either call my office number or you can use [facility].  If a referral for a test, for CPAP, or for another specialist was made, and you have not heard about scheduling this within a week, please call scheduling at [phone] ([phone]).  If you are unable to make your appointment for clinic or an overnight study, kindly call the office or sleep testing center at least 48 hours in advance to cancel and reschedule.  If you are on CPAP, please bring your device's card and/or the device to each clinic appointment.   In case of problems with PAP machine or mask interface, please contact your [employer] (Durable Medical Equipment) company first. [employer] is the company who provides you the machine and/or PAP supplies.       PRESCRIPTIONS     We require 7 days advanced notice for prescription refills. If we do not receive the request in this time, we cannot guarantee that your medication will be refilled in time.    IMPORTANT PHONE NUMBERS     Sleep Medicine Clinic Fax: [phone]  Appointments (for Pediatric  Sleep Clinic): [phone] - option 1  Appointments (for Adult Sleep Clinic): [phone] - option 2  Appointments (For Sleep Studies): [phone] - option 3  Behavioral Sleep Medicine: [phone]  Sleep Surgery: [phone]  Nutrition Service: [phone]  Weight management clinics with endocrinology: [phone]  Bariatric Services: [phone] (includes weight loss medications and weight loss surgery)  [facility]: [phone] (offers holistic approaches to weight management)  ENT (Otolaryngology): [phone]  Headache Clinic (Neurology): [phone]  Neurology: [phone]  Psychiatry: [phone]  Pulmonary Function Testing (PFT) Center: [phone]  Pulmonary Medicine: [phone]  [employer] ([employer]): [phone]      OUR SLEEP TESTING LOCATIONS     Our team will contact you to schedule your sleep study, however, you can contact us as follow:  Main Phone Line (scheduling only): [phone]), option 3  Adult and Pediatric Locations  [facility] (6 years and older): [facility] - 4th floor ([address] [place] [address]) After hours line: [phone]  [facility] at [facility] (Main campus: All ages): [facility], 6th floor. After hours line: [phone]   [place] (5 years and older; younger considered on case-by-case basis): [address]; [address], [address]. Scheduling  After hours line: [phone]   [place] (6 years and older): [address]; [address]; [address]   [place] (6 years and older): [address] [facility], [address]  After hours line: [phone]   [place] (13 years and older) in [place]: [address] [facility], 2nd floor  After hours line: [phone]   [place] (13 year and older): [address]  After hours line: [phone]     Adult Only Locations:   [place] (18 years and older): [address] [place] [address], 2nd floor    "[place] (18 years and older): [address] [facility]; 4th floor  After hours line: [phone]  [facility] (18 years and older) at [place]: [address] [facility]  After hours line: [phone]     CONTACTING YOUR SLEEP MEDICINE PROVIDER AND SLEEP TEAM      For issues with your machine or mask interface, please call your DME provider first. DME stands for durable medical company. DME is the company who provides you the machine and/or PAP supplies / accessories.   To schedule, cancel, or reschedule SLEEP STUDY APPOINTMENTS, please call the Main Phone Line at [phone] - option 3.   To schedule, cancel, or reschedule CLINIC APPOINTMENTS, you can do it in \"[employer] [facility]\", call [phone] to speak with my  (Franny Canales), or call the Main Phone Line at [phone] - option 2  For CLINICAL QUESTIONS or MEDICATION REFILLS, please call direct line for Adult Sleep Nurses at [phone].   Lastly, you can also send a message directly to your provider through \"My Chart\", which is the email service through your  Records Account: [email or web address]       Here at [facility], we wish you a restful sleep!    "

## 2024-03-11 ENCOUNTER — OFFICE VISIT (OUTPATIENT)
Dept: CARDIOLOGY | Facility: HOSPITAL | Age: 69
End: 2024-03-11
Payer: MEDICARE

## 2024-03-11 VITALS
WEIGHT: 233.03 LBS | HEART RATE: 76 BPM | SYSTOLIC BLOOD PRESSURE: 146 MMHG | DIASTOLIC BLOOD PRESSURE: 90 MMHG | OXYGEN SATURATION: 97 % | BODY MASS INDEX: 32.5 KG/M2

## 2024-03-11 DIAGNOSIS — I10 HYPERTENSION, UNSPECIFIED TYPE: ICD-10-CM

## 2024-03-11 DIAGNOSIS — I48.91 ATRIAL FIBRILLATION, UNSPECIFIED TYPE (MULTI): Primary | ICD-10-CM

## 2024-03-11 DIAGNOSIS — E78.5 HYPERLIPIDEMIA, UNSPECIFIED HYPERLIPIDEMIA TYPE: ICD-10-CM

## 2024-03-11 PROCEDURE — 3080F DIAST BP >= 90 MM HG: CPT | Performed by: NURSE PRACTITIONER

## 2024-03-11 PROCEDURE — 1160F RVW MEDS BY RX/DR IN RCRD: CPT | Performed by: NURSE PRACTITIONER

## 2024-03-11 PROCEDURE — 99213 OFFICE O/P EST LOW 20 MIN: CPT | Performed by: NURSE PRACTITIONER

## 2024-03-11 PROCEDURE — 1036F TOBACCO NON-USER: CPT | Performed by: NURSE PRACTITIONER

## 2024-03-11 PROCEDURE — 1159F MED LIST DOCD IN RCRD: CPT | Performed by: NURSE PRACTITIONER

## 2024-03-11 PROCEDURE — 3077F SYST BP >= 140 MM HG: CPT | Performed by: NURSE PRACTITIONER

## 2024-03-11 PROCEDURE — 1126F AMNT PAIN NOTED NONE PRSNT: CPT | Performed by: NURSE PRACTITIONER

## 2024-03-11 RX ORDER — OLMESARTAN MEDOXOMIL AND HYDROCHLOROTHIAZIDE 40/25 40; 25 MG/1; MG/1
1 TABLET ORAL DAILY
Qty: 90 TABLET | Refills: 3 | Status: SHIPPED | OUTPATIENT
Start: 2024-03-11 | End: 2025-03-11

## 2024-03-11 RX ORDER — SIMVASTATIN 40 MG/1
40 TABLET, FILM COATED ORAL NIGHTLY
Qty: 90 TABLET | Refills: 3 | Status: SHIPPED | OUTPATIENT
Start: 2024-03-11 | End: 2025-03-11

## 2024-03-11 RX ORDER — METOPROLOL TARTRATE 50 MG/1
50 TABLET ORAL 2 TIMES DAILY
Qty: 180 TABLET | Refills: 3 | Status: SHIPPED | OUTPATIENT
Start: 2024-03-11 | End: 2025-03-11

## 2024-03-11 ASSESSMENT — ENCOUNTER SYMPTOMS
PSYCHIATRIC NEGATIVE: 1
GASTROINTESTINAL NEGATIVE: 1
NEUROLOGICAL NEGATIVE: 1
EYES NEGATIVE: 1
RESPIRATORY NEGATIVE: 1
HEMATOLOGIC/LYMPHATIC NEGATIVE: 1
MYALGIAS: 1
CONSTITUTIONAL NEGATIVE: 1
CARDIOVASCULAR NEGATIVE: 1
ENDOCRINE NEGATIVE: 1

## 2024-03-11 NOTE — PROGRESS NOTES
Referred by Dr. Alejandra pimentel. provider found for Follow-up (1 yr)     History Of Present Illness:    Raheem Washington is a very pleasant 68 year old gentleman with a history of HTN, HLD, CVA (2022) and atrial fibrillation s/p ablation (2022), he is here for a follow up visit. The patient is seen in collaboration with Dr. Brown. Mr. Washington states he has been feeling well. Continues to stay active. Blood pressure has been well controlled at home. He denies chest pain, shortness of breath or heart palpitations.       Review of Systems   Constitutional: Negative.   HENT: Negative.     Eyes: Negative.    Cardiovascular: Negative.    Respiratory: Negative.     Endocrine: Negative.    Hematologic/Lymphatic: Negative.    Skin: Negative.    Musculoskeletal:  Positive for myalgias.   Gastrointestinal: Negative.    Neurological: Negative.    Psychiatric/Behavioral: Negative.          Past Medical History:  He has a past medical history of Cervicalgia (06/14/2021) and Other specified abnormal findings of blood chemistry (06/25/2020).    Past Surgical History:  He has a past surgical history that includes Other surgical history (08/08/2017); Vasectomy (01/02/2018); and Other surgical history (01/02/2018).      Social History:  He reports that he quit smoking about 9 years ago. His smoking use included cigarettes. He has a 20.00 pack-year smoking history. He has never used smokeless tobacco. He reports current alcohol use of about 5.0 standard drinks of alcohol per week. He reports that he does not use drugs.    Family History:  Family History   Problem Relation Name Age of Onset    Hyperlipidemia Mother      Hypertension Mother      Hyperlipidemia Father      Hypertension Father      Other (cardiac disorder) Father          Allergies:  Patient has no known allergies.    Outpatient Medications:  Current Outpatient Medications   Medication Instructions    apixaban (Eliquis) 5 mg tablet 1 tablet, oral, 2 times daily    aspirin 81  mg EC tablet 1 tablet, oral, Daily    metoprolol tartrate (Lopressor) 50 mg tablet 1 tablet, oral, 2 times daily    olmesartan-hydrochlorothiazide (BENIcar HCT) 40-25 mg tablet 1 tablet, oral, Daily    simvastatin (Zocor) 40 mg tablet 1 tablet, oral, Nightly        Last Recorded Vitals:  Vitals:    03/11/24 0840   BP: 146/90   Pulse: 76   SpO2: 97%   Weight: 106 kg (233 lb 0.4 oz)       Physical Exam:  Physical Exam  Vitals reviewed.   HENT:      Head: Normocephalic.      Nose: Nose normal.   Eyes:      Pupils: Pupils are equal, round, and reactive to light.   Cardiovascular:      Rate and Rhythm: Normal rate and regular rhythm.   Pulmonary:      Effort: Pulmonary effort is normal.      Breath sounds: Normal breath sounds.   Abdominal:      General: Abdomen is flat.      Palpations: Abdomen is soft.   Musculoskeletal:         General: Normal range of motion.      Cervical back: Normal range of motion.   Skin:     General: Skin is warm and dry.   Neurological:      General: No focal deficit present.      Mental Status: He is alert and oriented to person, place, and time.   Psychiatric:         Mood and Affect: Mood normal.            Last Labs:  CBC -  Lab Results   Component Value Date    WBC 5.5 04/03/2023    HGB 16.2 04/03/2023    HCT 49.7 04/03/2023    MCV 97 04/03/2023     04/03/2023       CMP -  Lab Results   Component Value Date    CALCIUM 9.0 04/03/2023    PHOS 3.5 01/31/2022    PROT 6.9 04/03/2023    ALBUMIN 4.2 04/03/2023    AST 18 04/03/2023    ALT 29 04/03/2023    ALKPHOS 64 04/03/2023    BILITOT 0.6 04/03/2023       LIPID PANEL -   Lab Results   Component Value Date    CHOL 151 04/03/2023    TRIG 187 (H) 04/03/2023    HDL 41.7 04/03/2023    CHHDL 3.6 04/03/2023    LDLF 72 04/03/2023    VLDL 37 04/03/2023    NHDL 113 03/24/2022       RENAL FUNCTION PANEL -   Lab Results   Component Value Date    GLUCOSE 102 (H) 04/03/2023     04/03/2023    K 3.7 04/03/2023     04/03/2023    CO2 29  04/03/2023    ANIONGAP 13 04/03/2023    BUN 21 04/03/2023    CREATININE 1.05 04/03/2023    GFRMALE 78 04/03/2023    CALCIUM 9.0 04/03/2023    PHOS 3.5 01/31/2022    ALBUMIN 4.2 04/03/2023        Lab Results   Component Value Date    BNP 87 01/28/2022    HGBA1C 6.0 (A) 01/28/2022       Last Cardiology Tests:  ECG:    Echo:  ECHO: 1/28/2022  1. The left ventricular systolic function is mildly decreased with a 45-50% estimated ejection fraction.  2. Spectral Doppler shows an abnormal pattern of left ventricular diastolic filling.  3. There is mild aortic valve regurgitation.  4. There is mild mitral and tricuspid regurgitation.  5. The patient is in atrial fibrillation which may influence the estimate of left ventricular function and transvalvular flows.  6. There is no evidence of a patent foramen ovale.  Ejection Fractions:  LVEF 45-50%  Cath:    Stress Test:    Cardiac Imaging:      Assessment/Plan   Very pleasant 68 year old male with a history of tobacco dependence, HTN, HLD, CVA (2022) and afib s/p ablation (6/2022), clinically remains in sinus rhythm. He denies recurrent heart palpitations. He continues to do well from a cardiac standpoint. Heart rate and blood pressure are well controlled today.         Plan:  Continue Olmesartan/HCTZ 40-25 mg daily  Restrict dietary sodium to less than 2000 mg daily  Monitor BP at home and record  Continue Eliquis 5 mg twice daily  Continue daily aspirin  Continue metoprolol and simvastatin  Follow up in one year   Call with any questions               Jess Jose, APRN-CNP

## 2024-04-02 ASSESSMENT — ENCOUNTER SYMPTOMS
MUSCULOSKELETAL NEGATIVE: 1
NAUSEA: 0
FATIGUE: 0
WEAKNESS: 0
NUMBNESS: 0
HEADACHES: 0
DIARRHEA: 0
PSYCHIATRIC NEGATIVE: 1
PALPITATIONS: 0
SHORTNESS OF BREATH: 0
DIZZINESS: 0
SORE THROAT: 0
FEVER: 0
VOMITING: 0
CONSTIPATION: 0
COUGH: 0
ABDOMINAL PAIN: 0
CHILLS: 0

## 2024-04-02 NOTE — PROGRESS NOTES
"Subjective   Patient ID: Raheem Washington is a 68 y.o. male who presents for follow up.      HPI   Here for follow up.  Follows closely with cardiology.  Just had an appointment. All is stable.  Wears CPAP nighty.  Denies chest pain, SOB, palpitations, dizziness,  or GI issues.      Review of Systems   Constitutional:  Negative for chills, fatigue and fever.   HENT:  Negative for congestion, ear pain and sore throat.    Eyes:  Negative for visual disturbance.   Respiratory:  Negative for cough and shortness of breath.    Cardiovascular:  Negative for chest pain, palpitations and leg swelling.   Gastrointestinal:  Negative for abdominal pain, constipation, diarrhea, nausea and vomiting.   Genitourinary: Negative.    Musculoskeletal: Negative.    Skin:  Negative for rash.   Neurological:  Negative for dizziness, weakness, numbness and headaches.   Psychiatric/Behavioral: Negative.         Objective   /83   Pulse 71   Ht 1.803 m (5' 11\")   Wt 106 kg (233 lb)   SpO2 95%   BMI 32.50 kg/m²     Physical Exam  Constitutional:       General: He is not in acute distress.     Appearance: Normal appearance.   HENT:      Head: Normocephalic and atraumatic.      Right Ear: Tympanic membrane, ear canal and external ear normal.      Left Ear: Tympanic membrane, ear canal and external ear normal.      Nose: Nose normal.      Mouth/Throat:      Mouth: Mucous membranes are moist.      Pharynx: Oropharynx is clear.   Eyes:      Extraocular Movements: Extraocular movements intact.      Conjunctiva/sclera: Conjunctivae normal.      Pupils: Pupils are equal, round, and reactive to light.   Cardiovascular:      Rate and Rhythm: Normal rate and regular rhythm.      Pulses: Normal pulses.      Heart sounds: Normal heart sounds. No murmur heard.  Pulmonary:      Effort: Pulmonary effort is normal.      Breath sounds: Normal breath sounds. No wheezing, rhonchi or rales.   Abdominal:      General: Bowel sounds are normal.      " Palpations: Abdomen is soft.      Tenderness: There is no abdominal tenderness.   Musculoskeletal:         General: Normal range of motion.      Cervical back: Normal range of motion and neck supple.   Lymphadenopathy:      Comments: No lymphadenopathy noted   Skin:     General: Skin is warm and dry.      Findings: No rash.   Neurological:      General: No focal deficit present.      Mental Status: He is alert and oriented to person, place, and time.      Cranial Nerves: No cranial nerve deficit.      Coordination: Coordination normal.      Gait: Gait normal.   Psychiatric:         Mood and Affect: Mood normal.         Behavior: Behavior normal.         Assessment/Plan   Problem List Items Addressed This Visit             ICD-10-CM    HLD (hyperlipidemia) E78.5    Relevant Orders    Comprehensive Metabolic Panel    TSH with reflex to Free T4 if abnormal    Lipid Panel    CBC and Auto Differential    Hypertension I10    Prediabetes R73.03    Relevant Orders    Comprehensive Metabolic Panel    TSH with reflex to Free T4 if abnormal    Lipid Panel    CBC and Auto Differential    Thoracic aortic aneurysm without rupture (CMS/HCC) I71.20     Monitored and stable         Vitamin D deficiency E55.9    Relevant Medications    ergocalciferol (Vitamin D-2) 1.25 MG (43843 UT) capsule    Other Relevant Orders    Vitamin D 25-Hydroxy,Total (for eval of Vitamin D levels)    Medicare annual wellness visit, subsequent - Primary Z00.00    ALEXANDRE on CPAP G47.33    Chronic diastolic heart failure (CMS/HCC) I50.32     managed          Other Visit Diagnoses         Codes    Routine general medical examination at health care facility     Z00.00        Continue all cardiac medications as prescribed.  Follow up in 6 months or sooner if needed

## 2024-04-03 ENCOUNTER — OFFICE VISIT (OUTPATIENT)
Dept: PRIMARY CARE | Facility: CLINIC | Age: 69
End: 2024-04-03
Payer: MEDICARE

## 2024-04-03 ENCOUNTER — LAB (OUTPATIENT)
Dept: LAB | Facility: LAB | Age: 69
End: 2024-04-03
Payer: MEDICARE

## 2024-04-03 VITALS
BODY MASS INDEX: 32.62 KG/M2 | SYSTOLIC BLOOD PRESSURE: 135 MMHG | HEIGHT: 71 IN | HEART RATE: 71 BPM | OXYGEN SATURATION: 95 % | WEIGHT: 233 LBS | DIASTOLIC BLOOD PRESSURE: 83 MMHG

## 2024-04-03 DIAGNOSIS — E55.9 VITAMIN D DEFICIENCY: ICD-10-CM

## 2024-04-03 DIAGNOSIS — I10 PRIMARY HYPERTENSION: ICD-10-CM

## 2024-04-03 DIAGNOSIS — Z00.00 MEDICARE ANNUAL WELLNESS VISIT, SUBSEQUENT: Primary | ICD-10-CM

## 2024-04-03 DIAGNOSIS — E78.5 HYPERLIPIDEMIA, UNSPECIFIED HYPERLIPIDEMIA TYPE: ICD-10-CM

## 2024-04-03 DIAGNOSIS — I50.32 CHRONIC DIASTOLIC HEART FAILURE (MULTI): ICD-10-CM

## 2024-04-03 DIAGNOSIS — I71.20 THORACIC AORTIC ANEURYSM WITHOUT RUPTURE, UNSPECIFIED PART (CMS-HCC): ICD-10-CM

## 2024-04-03 DIAGNOSIS — Z00.00 ROUTINE GENERAL MEDICAL EXAMINATION AT HEALTH CARE FACILITY: ICD-10-CM

## 2024-04-03 DIAGNOSIS — G47.33 OSA ON CPAP: ICD-10-CM

## 2024-04-03 DIAGNOSIS — R73.03 PREDIABETES: ICD-10-CM

## 2024-04-03 LAB
25(OH)D3 SERPL-MCNC: 25 NG/ML (ref 30–100)
ALBUMIN SERPL BCP-MCNC: 4.4 G/DL (ref 3.4–5)
ALP SERPL-CCNC: 62 U/L (ref 33–136)
ALT SERPL W P-5'-P-CCNC: 27 U/L (ref 10–52)
ANION GAP SERPL CALC-SCNC: 11 MMOL/L (ref 10–20)
AST SERPL W P-5'-P-CCNC: 19 U/L (ref 9–39)
BASOPHILS # BLD AUTO: 0.05 X10*3/UL (ref 0–0.1)
BASOPHILS NFR BLD AUTO: 1.1 %
BILIRUB SERPL-MCNC: 0.6 MG/DL (ref 0–1.2)
BUN SERPL-MCNC: 22 MG/DL (ref 6–23)
CALCIUM SERPL-MCNC: 9.4 MG/DL (ref 8.6–10.3)
CHLORIDE SERPL-SCNC: 105 MMOL/L (ref 98–107)
CHOLEST SERPL-MCNC: 146 MG/DL (ref 0–199)
CHOLESTEROL/HDL RATIO: 3.8
CO2 SERPL-SCNC: 29 MMOL/L (ref 21–32)
CREAT SERPL-MCNC: 1.06 MG/DL (ref 0.5–1.3)
EGFRCR SERPLBLD CKD-EPI 2021: 76 ML/MIN/1.73M*2
EOSINOPHIL # BLD AUTO: 0.21 X10*3/UL (ref 0–0.7)
EOSINOPHIL NFR BLD AUTO: 4.6 %
ERYTHROCYTE [DISTWIDTH] IN BLOOD BY AUTOMATED COUNT: 12.4 % (ref 11.5–14.5)
GLUCOSE SERPL-MCNC: 113 MG/DL (ref 74–99)
HCT VFR BLD AUTO: 49.1 % (ref 41–52)
HDLC SERPL-MCNC: 38.1 MG/DL
HGB BLD-MCNC: 16.2 G/DL (ref 13.5–17.5)
IMM GRANULOCYTES # BLD AUTO: 0.01 X10*3/UL (ref 0–0.7)
IMM GRANULOCYTES NFR BLD AUTO: 0.2 % (ref 0–0.9)
LDLC SERPL CALC-MCNC: 79 MG/DL
LYMPHOCYTES # BLD AUTO: 1.43 X10*3/UL (ref 1.2–4.8)
LYMPHOCYTES NFR BLD AUTO: 31.6 %
MCH RBC QN AUTO: 32 PG (ref 26–34)
MCHC RBC AUTO-ENTMCNC: 33 G/DL (ref 32–36)
MCV RBC AUTO: 97 FL (ref 80–100)
MONOCYTES # BLD AUTO: 0.62 X10*3/UL (ref 0.1–1)
MONOCYTES NFR BLD AUTO: 13.7 %
NEUTROPHILS # BLD AUTO: 2.21 X10*3/UL (ref 1.2–7.7)
NEUTROPHILS NFR BLD AUTO: 48.8 %
NON HDL CHOLESTEROL: 108 MG/DL (ref 0–149)
NRBC BLD-RTO: 0 /100 WBCS (ref 0–0)
PLATELET # BLD AUTO: 190 X10*3/UL (ref 150–450)
POTASSIUM SERPL-SCNC: 4.3 MMOL/L (ref 3.5–5.3)
PROT SERPL-MCNC: 6.8 G/DL (ref 6.4–8.2)
RBC # BLD AUTO: 5.07 X10*6/UL (ref 4.5–5.9)
SODIUM SERPL-SCNC: 141 MMOL/L (ref 136–145)
TRIGL SERPL-MCNC: 145 MG/DL (ref 0–149)
TSH SERPL-ACNC: 2.57 MIU/L (ref 0.44–3.98)
VLDL: 29 MG/DL (ref 0–40)
WBC # BLD AUTO: 4.5 X10*3/UL (ref 4.4–11.3)

## 2024-04-03 PROCEDURE — 82306 VITAMIN D 25 HYDROXY: CPT

## 2024-04-03 PROCEDURE — 3075F SYST BP GE 130 - 139MM HG: CPT | Performed by: NURSE PRACTITIONER

## 2024-04-03 PROCEDURE — 3079F DIAST BP 80-89 MM HG: CPT | Performed by: NURSE PRACTITIONER

## 2024-04-03 PROCEDURE — 80053 COMPREHEN METABOLIC PANEL: CPT

## 2024-04-03 PROCEDURE — 1160F RVW MEDS BY RX/DR IN RCRD: CPT | Performed by: NURSE PRACTITIONER

## 2024-04-03 PROCEDURE — 1036F TOBACCO NON-USER: CPT | Performed by: NURSE PRACTITIONER

## 2024-04-03 PROCEDURE — 85025 COMPLETE CBC W/AUTO DIFF WBC: CPT

## 2024-04-03 PROCEDURE — 99214 OFFICE O/P EST MOD 30 MIN: CPT | Performed by: NURSE PRACTITIONER

## 2024-04-03 PROCEDURE — 36415 COLL VENOUS BLD VENIPUNCTURE: CPT

## 2024-04-03 PROCEDURE — G0439 PPPS, SUBSEQ VISIT: HCPCS | Performed by: NURSE PRACTITIONER

## 2024-04-03 PROCEDURE — 1124F ACP DISCUSS-NO DSCNMKR DOCD: CPT | Performed by: NURSE PRACTITIONER

## 2024-04-03 PROCEDURE — 80061 LIPID PANEL: CPT

## 2024-04-03 PROCEDURE — 1170F FXNL STATUS ASSESSED: CPT | Performed by: NURSE PRACTITIONER

## 2024-04-03 PROCEDURE — 84443 ASSAY THYROID STIM HORMONE: CPT

## 2024-04-03 PROCEDURE — 1159F MED LIST DOCD IN RCRD: CPT | Performed by: NURSE PRACTITIONER

## 2024-04-03 RX ORDER — ERGOCALCIFEROL 1.25 MG/1
50000 CAPSULE ORAL
Qty: 12 CAPSULE | Refills: 0 | Status: SHIPPED | OUTPATIENT
Start: 2024-04-03 | End: 2024-06-26

## 2024-04-03 ASSESSMENT — ACTIVITIES OF DAILY LIVING (ADL)
DRESSING: INDEPENDENT
GROCERY_SHOPPING: INDEPENDENT
MANAGING_FINANCES: INDEPENDENT
TAKING_MEDICATION: INDEPENDENT
BATHING: INDEPENDENT
DOING_HOUSEWORK: INDEPENDENT

## 2024-04-10 ENCOUNTER — APPOINTMENT (OUTPATIENT)
Dept: SLEEP MEDICINE | Facility: CLINIC | Age: 69
End: 2024-04-10
Payer: MEDICARE

## 2024-07-30 ENCOUNTER — APPOINTMENT (OUTPATIENT)
Dept: CARDIOLOGY | Facility: HOSPITAL | Age: 69
End: 2024-07-30
Payer: MEDICARE

## 2024-08-06 ENCOUNTER — APPOINTMENT (OUTPATIENT)
Dept: CARDIOLOGY | Facility: HOSPITAL | Age: 69
End: 2024-08-06
Payer: MEDICARE

## 2024-08-08 ENCOUNTER — OFFICE VISIT (OUTPATIENT)
Dept: CARDIOLOGY | Facility: HOSPITAL | Age: 69
End: 2024-08-08
Payer: MEDICARE

## 2024-08-08 VITALS
WEIGHT: 234.35 LBS | OXYGEN SATURATION: 97 % | SYSTOLIC BLOOD PRESSURE: 145 MMHG | BODY MASS INDEX: 32.69 KG/M2 | DIASTOLIC BLOOD PRESSURE: 80 MMHG | HEART RATE: 76 BPM

## 2024-08-08 DIAGNOSIS — I48.91 ATRIAL FIBRILLATION, UNSPECIFIED TYPE (MULTI): Primary | ICD-10-CM

## 2024-08-08 LAB
ATRIAL RATE: 72 BPM
P AXIS: 48 DEGREES
P OFFSET: 190 MS
P ONSET: 137 MS
PR INTERVAL: 178 MS
Q ONSET: 226 MS
QRS COUNT: 12 BEATS
QRS DURATION: 88 MS
QT INTERVAL: 398 MS
QTC CALCULATION(BAZETT): 435 MS
QTC FREDERICIA: 423 MS
R AXIS: 11 DEGREES
T AXIS: 48 DEGREES
T OFFSET: 425 MS
VENTRICULAR RATE: 72 BPM

## 2024-08-08 PROCEDURE — 1160F RVW MEDS BY RX/DR IN RCRD: CPT | Performed by: NURSE PRACTITIONER

## 2024-08-08 PROCEDURE — 1159F MED LIST DOCD IN RCRD: CPT | Performed by: NURSE PRACTITIONER

## 2024-08-08 PROCEDURE — 3077F SYST BP >= 140 MM HG: CPT | Performed by: NURSE PRACTITIONER

## 2024-08-08 PROCEDURE — 1036F TOBACCO NON-USER: CPT | Performed by: NURSE PRACTITIONER

## 2024-08-08 PROCEDURE — 99214 OFFICE O/P EST MOD 30 MIN: CPT | Performed by: NURSE PRACTITIONER

## 2024-08-08 PROCEDURE — 3079F DIAST BP 80-89 MM HG: CPT | Performed by: NURSE PRACTITIONER

## 2024-08-08 PROCEDURE — 93005 ELECTROCARDIOGRAM TRACING: CPT | Performed by: NURSE PRACTITIONER

## 2024-08-08 ASSESSMENT — ENCOUNTER SYMPTOMS
SHORTNESS OF BREATH: 0
VOMITING: 0
PALPITATIONS: 0
BLURRED VISION: 0
LIGHT-HEADEDNESS: 0
SYNCOPE: 0
WEAKNESS: 0
ABDOMINAL PAIN: 0
DOUBLE VISION: 0
ORTHOPNEA: 0
IRREGULAR HEARTBEAT: 0
DYSPNEA ON EXERTION: 0
MYALGIAS: 0
HEADACHES: 0
PND: 0
SPUTUM PRODUCTION: 0
HEMOPTYSIS: 0
SNORING: 0
NAUSEA: 0
NEAR-SYNCOPE: 0
DIARRHEA: 0
FEVER: 0
COUGH: 0
DIZZINESS: 0
DIAPHORESIS: 0
FALLS: 0
SORE THROAT: 0

## 2024-08-08 NOTE — PROGRESS NOTES
Subjective   Raheem Washington is a 68 y.o. male.    Chief Complaint:  Follow-up (Routine.) and Atrial Fibrillation    Raheem Washington is a 68 y.o. year old male patient with     1. Hypertension- slightly elevated today     2. Stroke January 2022. Received TPA. No residual effects from stroke     3. Atrial fibrillation- first diagnosed end of January 2022 when patient presented with stroke. TPA given. Echocardiogram at that time showed LV function 45-50% with normal left atrium. Patient underwent DCCV 3/3/2022 though thinks he went back into AF a few days later. ECG on 3/20 showed AF. Patient s/p PVI ablation 6/28/2022.     ECG 8/8/2024 NSR HR 72 bpm    TODAY Patient presents for 6 month follow up of Atrial fibrillation.  He has no cardiac complaints and denies any bleeding issues on the Eliquis.    /80   Pulse 76   Wt 106 kg (234 lb 5.6 oz)   SpO2 97%   BMI 32.69 kg/m²   Current Outpatient Medications on File Prior to Visit   Medication Sig Dispense Refill    apixaban (Eliquis) 5 mg tablet Take 1 tablet (5 mg) by mouth 2 times a day. 180 tablet 3    aspirin 81 mg EC tablet Take 1 tablet (81 mg) by mouth once daily.      metoprolol tartrate (Lopressor) 50 mg tablet Take 1 tablet by mouth 2 times a day. 180 tablet 3    olmesartan-hydrochlorothiazide (BENIcar HCT) 40-25 mg tablet Take 1 tablet by mouth once daily. 90 tablet 3    simvastatin (Zocor) 40 mg tablet Take 1 tablet (40 mg) by mouth once daily at bedtime. 90 tablet 3     No current facility-administered medications on file prior to visit.         Review of Systems   Constitutional: Negative for diaphoresis, fever and malaise/fatigue.   HENT:  Negative for congestion and sore throat.    Eyes:  Negative for blurred vision and double vision.   Cardiovascular:  Negative for chest pain, dyspnea on exertion, irregular heartbeat, leg swelling, near-syncope, orthopnea, palpitations, paroxysmal nocturnal dyspnea and syncope.   Respiratory:  Negative for  cough, hemoptysis, shortness of breath, snoring and sputum production.    Hematologic/Lymphatic: Negative for bleeding problem.   Skin:  Negative for rash.   Musculoskeletal:  Negative for falls, joint pain and myalgias.   Gastrointestinal:  Negative for abdominal pain, diarrhea, nausea and vomiting.   Neurological:  Negative for dizziness, headaches, light-headedness and weakness.   All other systems reviewed and are negative.      Objective   Constitutional:       Appearance: Healthy appearance. Not in distress.   Eyes:      Conjunctiva/sclera: Conjunctivae normal.   HENT:      Nose: Nose normal.    Mouth/Throat:      Pharynx: Oropharynx is clear.   Pulmonary:      Effort: Pulmonary effort is normal.      Breath sounds: Normal breath sounds. No wheezing. No rhonchi.   Chest:      Chest wall: Not tender to palpatation.   Cardiovascular:      Normal rate. Regular rhythm.      Murmurs: There is no murmur.      No rub.   Pulses:     Intact distal pulses.   Edema:     Peripheral edema absent.   Abdominal:      General: Bowel sounds are normal.      Palpations: Abdomen is soft.   Musculoskeletal: Normal range of motion.      Cervical back: Neck supple. Skin:     General: Skin is warm and dry.   Neurological:      Mental Status: Alert and oriented to person, place and time.      Motor: Motor function is intact.         Lab Review:   Lab Results   Component Value Date     04/03/2024    K 4.3 04/03/2024     04/03/2024    CO2 29 04/03/2024    BUN 22 04/03/2024    CREATININE 1.06 04/03/2024    GLUCOSE 113 (H) 04/03/2024    CALCIUM 9.4 04/03/2024     Lab Results   Component Value Date    WBC 4.5 04/03/2024    HGB 16.2 04/03/2024    HCT 49.1 04/03/2024    MCV 97 04/03/2024     04/03/2024       Assessment/Plan   The encounter diagnosis was Atrial fibrillation, unspecified type (Multi).  Patient presents today for follow-up of his atrial fibrillation.  He is now 2 years post ablation and maintaining normal  sinus rhythm.  Denies any further cardiac complaints.  He denies any bleeding on his Eliquis.  His [id number] score is 4, so would likely benefit from indefinite anticoagulation.  His only complaint is the cost.    Continue metoprolol and Eliquis  Continue blood pressure medication and sleep apnea treatment  I will refer to the clinical pharmacy to see if he qualifies for patient assistance regarding the Eliquis.  Follow-up with me in 1 year  Follow-up with general cardiology as scheduled.

## 2024-10-03 ENCOUNTER — APPOINTMENT (OUTPATIENT)
Dept: PHARMACY | Facility: HOSPITAL | Age: 69
End: 2024-10-03
Payer: MEDICARE

## 2024-10-03 DIAGNOSIS — I48.91 ATRIAL FIBRILLATION, UNSPECIFIED TYPE (MULTI): ICD-10-CM

## 2024-10-03 NOTE — Clinical Note
Niko Herndon,  Today I spoke with Raheem about his Eliquis/cost assistance program. Patient states he is doing well on anticoagulation therapy. Patient reports adherence, no adverse effects, and denies s/s of bleeding. Patient should be approved for  PAP, will submit income documentation once received and follow up in 1 month. Thank you!

## 2024-10-15 ENCOUNTER — TELEPHONE (OUTPATIENT)
Dept: PHARMACY | Facility: HOSPITAL | Age: 69
End: 2024-10-15
Payer: MEDICARE

## 2024-10-15 NOTE — TELEPHONE ENCOUNTER
Unfortunately, at this time, Raheem Washington is ineligible to receive financial assistance through  Patient Assistance Program because his income was too high.    Patient was notified of ineligibility and given the following options: re-screen for eligibility in 2025, obtain samples from doctor office, explore medicare part D open enrollment options for better coverage.     Patient reports he does have the ability to afford Eliquis until his re-screening appointment in January.     Referring provider will be notified of denial.     Kelley De Santiago, PharmD

## 2024-11-14 ENCOUNTER — APPOINTMENT (OUTPATIENT)
Dept: PHARMACY | Facility: HOSPITAL | Age: 69
End: 2024-11-14
Payer: MEDICARE

## 2024-12-03 ENCOUNTER — TELEPHONE (OUTPATIENT)
Dept: PULMONOLOGY | Facility: CLINIC | Age: 69
End: 2024-12-03

## 2024-12-23 DIAGNOSIS — I48.91 ATRIAL FIBRILLATION, UNSPECIFIED TYPE (MULTI): ICD-10-CM

## 2024-12-30 DIAGNOSIS — I10 HYPERTENSION, UNSPECIFIED TYPE: ICD-10-CM

## 2024-12-30 DIAGNOSIS — E78.5 HYPERLIPIDEMIA, UNSPECIFIED HYPERLIPIDEMIA TYPE: ICD-10-CM

## 2024-12-30 RX ORDER — OLMESARTAN MEDOXOMIL AND HYDROCHLOROTHIAZIDE 40/25 40; 25 MG/1; MG/1
1 TABLET ORAL DAILY
Qty: 90 TABLET | Refills: 0 | Status: SHIPPED | OUTPATIENT
Start: 2024-12-30

## 2024-12-30 RX ORDER — SIMVASTATIN 40 MG/1
40 TABLET, FILM COATED ORAL NIGHTLY
Qty: 90 TABLET | Refills: 0 | Status: SHIPPED | OUTPATIENT
Start: 2024-12-30 | End: 2025-12-30

## 2024-12-30 RX ORDER — METOPROLOL TARTRATE 50 MG/1
50 TABLET ORAL 2 TIMES DAILY
Qty: 180 TABLET | Refills: 0 | Status: SHIPPED | OUTPATIENT
Start: 2024-12-30

## 2025-01-03 ENCOUNTER — APPOINTMENT (OUTPATIENT)
Dept: PHARMACY | Facility: HOSPITAL | Age: 70
End: 2025-01-03
Payer: MEDICARE

## 2025-01-03 DIAGNOSIS — I48.91 ATRIAL FIBRILLATION, UNSPECIFIED TYPE (MULTI): ICD-10-CM

## 2025-01-03 NOTE — Clinical Note
Niko Herndon,  Today I spoke with Raheem about his Eliquis. Patient states he is doing well on anticoagulation therapy. Patient reports adherence, no adverse effects, and denies s/s of bleeding. Will follow up in 1 month to evaluate if patient is eligible for  PAP for the 2025 year. Thank you!

## 2025-01-03 NOTE — PROGRESS NOTES
Pharmacist Clinic: Cardiology Management    Raheem Washington is a 69 y.o. male was referred to Clinical Pharmacy Team for anticoagulation management.     Referring Provider: Katrina Betancourt APRN-*    THIS IS A FOLLOW UP PATIENT APPOINTMENT. AT LAST VISIT ON 10/03/2024 WITH PHARMACIST (Kelley De Santiago).    REVIEW OF LAST APPT  Patient medications and allergies were reviewed and updated.  Patient states he is doing well on anticoagulation therapy. Patient reports adherence, no adverse effects, and denies s/s of bleeding.   Patient screened for [facility] PAP, states he pays ~$450/ 90 day supply. Patient reports he will run out of Eliquis on Monday (10/7). Advised patient to obtain a 2 week supply of Eliquis to ensure he does not run out of Eliquis before [facility] PAP takes effect. Patient verbalized understanding.    Appointment was completed by Raheem who was reached at primary number.    Allergies Reviewed? No    No Known Allergies    Past Medical History:   Diagnosis Date    Cervicalgia 06/14/2021    Acute neck pain    Other specified abnormal findings of blood chemistry 06/25/2020    Abnormal CBC       Current Outpatient Medications on File Prior to Visit   Medication Sig Dispense Refill    apixaban (Eliquis) 5 mg tablet Take 1 tablet (5 mg) by mouth 2 times a day. 180 tablet 1    aspirin 81 mg EC tablet Take 1 tablet (81 mg) by mouth once daily.      metoprolol tartrate (Lopressor) 50 mg tablet TAKE 1 TABLET BY MOUTH TWICE A  tablet 0    olmesartan-hydrochlorothiazide (BENIcar HCT) 40-25 mg tablet TAKE 1 TABLET BY MOUTH EVERY DAY 90 tablet 0    simvastatin (Zocor) 40 mg tablet TAKE 1 TABLET (40 MG) BY MOUTH ONCE DAILY AT BEDTIME. 90 tablet 0    [DISCONTINUED] metoprolol tartrate (Lopressor) 50 mg tablet Take 1 tablet by mouth 2 times a day. 180 tablet 3    [DISCONTINUED] olmesartan-hydrochlorothiazide (BENIcar HCT) 40-25 mg tablet Take 1 tablet by mouth once daily. 90 tablet 3    [DISCONTINUED] simvastatin (Zocor)  "40 mg tablet Take 1 tablet (40 mg) by mouth once daily at bedtime. 90 tablet 3     No current facility-administered medications on file prior to visit.         RELEVANT LAB RESULTS  Lab Results   Component Value Date    BILITOT 0.6 04/03/2024    CALCIUM 9.4 04/03/2024    CO2 29 04/03/2024     04/03/2024    CREATININE 1.06 04/03/2024    GLUCOSE 113 (H) 04/03/2024    ALKPHOS 62 04/03/2024    K 4.3 04/03/2024    PROT 6.8 04/03/2024     04/03/2024    AST 19 04/03/2024    ALT 27 04/03/2024    BUN 22 04/03/2024    ANIONGAP 11 04/03/2024    MG 2.30 01/31/2022    PHOS 3.5 01/31/2022    ALBUMIN 4.4 04/03/2024    GFRMALE 78 04/03/2023     Lab Results   Component Value Date    TRIG 145 04/03/2024    CHOL 146 04/03/2024    LDLCALC 79 04/03/2024    HDL 38.1 04/03/2024     No results found for: \"BMCBC\", \"CBCDIF\"     PHARMACEUTICAL ASSESSMENT    MEDICATION RECONCILIATION    Drug Interactions? No    Medication Documentation Review Audit       Reviewed by MANUEL Luo (Nurse Practitioner) on 08/08/24 at 0829      Medication Order Taking? Sig Documenting Provider Last Dose Status   apixaban (Eliquis) 5 mg tablet [id number] Yes Take 1 tablet (5 mg) by mouth 2 times a day. EMORY Barrera-CNP Taking Active   aspirin 81 mg EC tablet [id number] Yes Take 1 tablet (81 mg) by mouth once daily. Historical Provider, MD Taking Active   metoprolol tartrate (Lopressor) 50 mg tablet [id number] Yes Take 1 tablet by mouth 2 times a day. EMORY Barrera-CNP Taking Active   olmesartan-hydrochlorothiazide (BENIcar HCT) 40-25 mg tablet [id number] Yes Take 1 tablet by mouth once daily. EMORY Barrera-CNP Taking Active   simvastatin (Zocor) 40 mg tablet [id number] Yes Take 1 tablet (40 mg) by mouth once daily at bedtime. Jess Noble, EMORY-CNP Taking Active                    DISEASE MANAGEMENT ASSESSMENT:     ANTICOAGULATION ASSESSMENT    The ASCVD Risk score (Beaufort DK, et al., 2019) failed to " calculate for the following reasons:    Risk score cannot be calculated because patient has a medical history suggesting prior/existing ASCVD    DIAGNOSIS: prevention of nonvalvular atrial fibrilliation stroke and systemic embolism  - Patient is projected to be on anticoagulation long term  - [id number] Score: [5] (only included if diagnosis is atrial fibrillation)   Age: [<65 (0)] [65-74 (+1)] [> 75 (+2)]: 1  Sex: [Male/Female (+1)]: 0  CHF history: [No/Yes(+1)]: 1  Hypertension history: [No/Yes(+1)]: 1  Stroke/TIA/thromboembolism history: [No/Yes(+2)]: 2  Vascular disease history (prior MI, peripheral artery disease, aortic plaque): [No/Yes(+1)]: 0  Diabetes history: [No/Yes(+1)]: 0    CURRENT PHARMACOTHERAPY:    Eliquis 5mg twice daily  68yo  106kg  Scr: 1.06 (04/03/24)    RELEVANT PAST MEDICAL HISTORY:   Afib, HTN, CHF, hx TIA    Affordability/Accessibility: $450/90 day supply  Adherence/Organization: reports adherence, uses a pillbox   Adverse Reactions: none reported  Recent Hospitalizations: none  Recent Falls/Trauma: none reported  Changes in Tobacco or Alcohol Intake:   Tobacco: does not use  Alcohol: occasionally     EDUCATION/COUNSELING:   - Counseled patient on MOA, expectations, duration of therapy, contraindications, administration, and monitoring parameters  - Counseled patient of side effects that are indicative of bleeding such as dark tarry stool, unexplainable bruising, or vomiting up a coffee ground like substance       DISCUSSION/NOTES:   Patient states he is doing well on anticoagulation therapy. Patient reports adherence, no adverse effects, and denies s/s of bleeding.   Unfortunately, patient income was >400% 2024 [employer], plan to re-evaluate [facility] once 2025 [employer] guidance is released.    ASSESSMENT AND PLAN:    Assessment/Plan   Problem List Items Addressed This Visit       Afib (Multi)     Patient age, weight and renal function appropriate to continue Eliquis 5mg twice daily.             Relevant Orders    Referral to Clinical Pharmacy       RECOMMENDATIONS/PLAN    CONTINUE  Eliquis 5mg twice daily    Last Appnt with Referring Provider: 08/08/2024  Next Appnt with Referring Provider: 08/07/2025  Clinical Pharmacist follow up: 1 month  VAF/Application Expiration: pending  Type of Encounter: Rene De Santiago, Anahi    Verbal consent to manage patient's drug therapy was obtained from the patient . They were informed they may decline to participate or withdraw from participation in pharmacy services at any time.    Continue all meds under the continuation of care with the referring provider and clinical pharmacy team.  .

## 2025-01-24 ENCOUNTER — APPOINTMENT (OUTPATIENT)
Dept: PHARMACY | Facility: HOSPITAL | Age: 70
End: 2025-01-24
Payer: MEDICARE

## 2025-01-24 DIAGNOSIS — I48.91 ATRIAL FIBRILLATION, UNSPECIFIED TYPE (MULTI): ICD-10-CM

## 2025-01-24 NOTE — Clinical Note
Niko Herndon,  Today I spoke with Raheem about his Eliquis. Patient states he is doing well on anticoagulation therapy. Patient reports adherence, no adverse effects, and denies s/s of bleeding. Unfortunately, patient does not qualify for  PAP at this time because he makes too much money. We discussed alternative options like warfarin but Raheem wants to continue on Eliquis and will make ends meet with the cost. Thank you!

## 2025-01-24 NOTE — PROGRESS NOTES
Pharmacist Clinic: Cardiology Management    Raheem Washington is a 69 y.o. male was referred to Clinical Pharmacy Team for anticoagulation management.     Referring Provider: Katrina Betancourt APRN-*    THIS IS A FOLLOW UP PATIENT APPOINTMENT. AT LAST VISIT ON 01/03/2025 WITH PHARMACIST (Kelley De Santiago).    REVIEW OF LAST APPT  Patient states he is doing well on anticoagulation therapy. Patient reports adherence, no adverse effects, and denies s/s of bleeding.   Unfortunately, patient income was >400% 2024 [employer], plan to re-evaluate  PAP once 2025 [employer] guidance is released.    Appointment was completed by Raheem who was reached at primary number.    Allergies Reviewed? No    No Known Allergies    Past Medical History:   Diagnosis Date    Cervicalgia 06/14/2021    Acute neck pain    Other specified abnormal findings of blood chemistry 06/25/2020    Abnormal CBC       Current Outpatient Medications on File Prior to Visit   Medication Sig Dispense Refill    apixaban (Eliquis) 5 mg tablet Take 1 tablet (5 mg) by mouth 2 times a day. 180 tablet 1    aspirin 81 mg EC tablet Take 1 tablet (81 mg) by mouth once daily.      metoprolol tartrate (Lopressor) 50 mg tablet TAKE 1 TABLET BY MOUTH TWICE A  tablet 0    olmesartan-hydrochlorothiazide (BENIcar HCT) 40-25 mg tablet TAKE 1 TABLET BY MOUTH EVERY DAY 90 tablet 0    simvastatin (Zocor) 40 mg tablet TAKE 1 TABLET (40 MG) BY MOUTH ONCE DAILY AT BEDTIME. 90 tablet 0     No current facility-administered medications on file prior to visit.         RELEVANT LAB RESULTS  Lab Results   Component Value Date    BILITOT 0.6 04/03/2024    CALCIUM 9.4 04/03/2024    CO2 29 04/03/2024     04/03/2024    CREATININE 1.06 04/03/2024    GLUCOSE 113 (H) 04/03/2024    ALKPHOS 62 04/03/2024    K 4.3 04/03/2024    PROT 6.8 04/03/2024     04/03/2024    AST 19 04/03/2024    ALT 27 04/03/2024    BUN 22 04/03/2024    ANIONGAP 11 04/03/2024    MG 2.30 01/31/2022    PHOS 3.5  "01/31/2022    ALBUMIN 4.4 04/03/2024    GFRMALE 78 04/03/2023     Lab Results   Component Value Date    TRIG 145 04/03/2024    CHOL 146 04/03/2024    LDLCALC 79 04/03/2024    HDL 38.1 04/03/2024     No results found for: \"BMCBC\", \"CBCDIF\"     PHARMACEUTICAL ASSESSMENT    MEDICATION RECONCILIATION    Drug Interactions? No    Medication Documentation Review Audit       Reviewed by MANUEL Luo (Nurse Practitioner) on 08/08/24 at 0829      Medication Order Taking? Sig Documenting Provider Last Dose Status   apixaban (Eliquis) 5 mg tablet [id number] Yes Take 1 tablet (5 mg) by mouth 2 times a day. MANUEL Barrera Taking Active   aspirin 81 mg EC tablet [id number] Yes Take 1 tablet (81 mg) by mouth once daily. Historical Provider, MD Taking Active   metoprolol tartrate (Lopressor) 50 mg tablet [id number] Yes Take 1 tablet by mouth 2 times a day. MANUEL Barrera Taking Active   olmesartan-hydrochlorothiazide (BENIcar HCT) 40-25 mg tablet [id number] Yes Take 1 tablet by mouth once daily. MANUEL Barrera Taking Active   simvastatin (Zocor) 40 mg tablet [id number] Yes Take 1 tablet (40 mg) by mouth once daily at bedtime. MANUEL Barrera Taking Active                    DISEASE MANAGEMENT ASSESSMENT:     ANTICOAGULATION ASSESSMENT    The ASCVD Risk score (Tessie DIEGO, et al., 2019) failed to calculate for the following reasons:    Risk score cannot be calculated because patient has a medical history suggesting prior/existing ASCVD    DIAGNOSIS: prevention of nonvalvular atrial fibrilliation stroke and systemic embolism  - Patient is projected to be on anticoagulation long term  - [id number] Score: [5] (only included if diagnosis is atrial fibrillation)   Age: [<65 (0)] [65-74 (+1)] [> 75 (+2)]: 1  Sex: [Male/Female (+1)]: 0  CHF history: [No/Yes(+1)]: 1  Hypertension history: [No/Yes(+1)]: 1  Stroke/TIA/thromboembolism history: [No/Yes(+2)]: 2  Vascular disease " history (prior MI, peripheral artery disease, aortic plaque): [No/Yes(+1)]: 0  Diabetes history: [No/Yes(+1)]: 0    CURRENT PHARMACOTHERAPY:    Eliquis 5mg twice daily  70yo  106kg  Scr: 1.06 (04/03/24)    RELEVANT PAST MEDICAL HISTORY:   Afib, HTN, CHF, hx TIA    Affordability/Accessibility: $450/90 day supply  Adherence/Organization: reports adherence, uses a pillbox   Adverse Reactions: none reported  Recent Hospitalizations: none  Recent Falls/Trauma: none reported  Changes in Tobacco or Alcohol Intake:   Tobacco: does not use  Alcohol: occasionally     EDUCATION/COUNSELING:   - Counseled patient on MOA, expectations, duration of therapy, contraindications, administration, and monitoring parameters  - Counseled patient of side effects that are indicative of bleeding such as dark tarry stool, unexplainable bruising, or vomiting up a coffee ground like substance       DISCUSSION/NOTES:   Patient states he is doing well on anticoagulation therapy. Patient reports adherence, no adverse effects, and denies s/s of bleeding.   Unfortunately, patient does not qualify for  PAP at this time.    ASSESSMENT AND PLAN:    Unfortunately, at this time, Raheem Washington is ineligible to receive financial assistance through  Patient Assistance Program because patient makes too much money.    Referring provider will be notified of denial.     Kelley De Santiago, Anahi    Assessment/Plan   Problem List Items Addressed This Visit       Afib (Multi)     Patient age, weight and renal function appropriate to continue Eliquis 5mg twice daily.                    RECOMMENDATIONS/PLAN    CONTINUE  Eliquis 5mg twice daily    Last Appnt with Referring Provider: 08/08/2024  Next Appnt with Referring Provider: 08/07/2025  Clinical Pharmacist follow up: not needed  VAF/Application Expiration: No  Type of Encounter: Virtual    Kelley De Santiago, Anahi    Verbal consent to manage patient's drug therapy was obtained from the patient . They  were informed they may decline to participate or withdraw from participation in pharmacy services at any time.    Continue all meds under the continuation of care with the referring provider and clinical pharmacy team.  .

## 2025-02-05 ENCOUNTER — OFFICE VISIT (OUTPATIENT)
Dept: SLEEP MEDICINE | Facility: CLINIC | Age: 70
End: 2025-02-05
Payer: MEDICARE

## 2025-02-05 VITALS
DIASTOLIC BLOOD PRESSURE: 87 MMHG | BODY MASS INDEX: 33.61 KG/M2 | HEART RATE: 79 BPM | RESPIRATION RATE: 16 BRPM | WEIGHT: 241.01 LBS | OXYGEN SATURATION: 96 % | SYSTOLIC BLOOD PRESSURE: 140 MMHG

## 2025-02-05 DIAGNOSIS — I48.0 PAROXYSMAL ATRIAL FIBRILLATION (MULTI): ICD-10-CM

## 2025-02-05 DIAGNOSIS — I10 BENIGN ESSENTIAL HYPERTENSION: ICD-10-CM

## 2025-02-05 DIAGNOSIS — G47.33 OSA ON CPAP: Primary | ICD-10-CM

## 2025-02-05 DIAGNOSIS — E66.9 OBESITY (BMI 30-39.9): ICD-10-CM

## 2025-02-05 PROCEDURE — 1036F TOBACCO NON-USER: CPT | Performed by: INTERNAL MEDICINE

## 2025-02-05 PROCEDURE — 3077F SYST BP >= 140 MM HG: CPT | Performed by: INTERNAL MEDICINE

## 2025-02-05 PROCEDURE — G2211 COMPLEX E/M VISIT ADD ON: HCPCS | Performed by: INTERNAL MEDICINE

## 2025-02-05 PROCEDURE — 3079F DIAST BP 80-89 MM HG: CPT | Performed by: INTERNAL MEDICINE

## 2025-02-05 PROCEDURE — 1126F AMNT PAIN NOTED NONE PRSNT: CPT | Performed by: INTERNAL MEDICINE

## 2025-02-05 PROCEDURE — 99214 OFFICE O/P EST MOD 30 MIN: CPT | Performed by: INTERNAL MEDICINE

## 2025-02-05 PROCEDURE — 1160F RVW MEDS BY RX/DR IN RCRD: CPT | Performed by: INTERNAL MEDICINE

## 2025-02-05 PROCEDURE — 1159F MED LIST DOCD IN RCRD: CPT | Performed by: INTERNAL MEDICINE

## 2025-02-05 ASSESSMENT — SLEEP AND FATIGUE QUESTIONNAIRES
HOW LIKELY ARE YOU TO NOD OFF OR FALL ASLEEP WHILE LYING DOWN TO REST IN THE AFTERNOON WHEN CIRCUMSTANCES PERMIT: WOULD NEVER DOZE
HOW LIKELY ARE YOU TO NOD OFF OR FALL ASLEEP WHEN YOU ARE A PASSENGER IN A CAR FOR AN HOUR WITHOUT A BREAK: WOULD NEVER DOZE
HOW LIKELY ARE YOU TO NOD OFF OR FALL ASLEEP IN A CAR, WHILE STOPPED FOR A FEW MINUTES IN TRAFFIC: WOULD NEVER DOZE
SITING INACTIVE IN A PUBLIC PLACE LIKE A CLASS ROOM OR A MOVIE THEATER: WOULD NEVER DOZE
HOW LIKELY ARE YOU TO NOD OFF OR FALL ASLEEP WHILE SITTING AND TALKING TO SOMEONE: WOULD NEVER DOZE
HOW LIKELY ARE YOU TO NOD OFF OR FALL ASLEEP WHILE SITTING QUIETLY AFTER LUNCH WITHOUT ALCOHOL: WOULD NEVER DOZE
HOW LIKELY ARE YOU TO NOD OFF OR FALL ASLEEP WHILE SITTING AND READING: SLIGHT CHANCE OF DOZING
HOW LIKELY ARE YOU TO NOD OFF OR FALL ASLEEP WHILE WATCHING TV: SLIGHT CHANCE OF DOZING
ESS-CHAD TOTAL SCORE: 2

## 2025-02-05 ASSESSMENT — COLUMBIA-SUICIDE SEVERITY RATING SCALE - C-SSRS
6. HAVE YOU EVER DONE ANYTHING, STARTED TO DO ANYTHING, OR PREPARED TO DO ANYTHING TO END YOUR LIFE?: NO
2. HAVE YOU ACTUALLY HAD ANY THOUGHTS OF KILLING YOURSELF?: NO
1. IN THE PAST MONTH, HAVE YOU WISHED YOU WERE DEAD OR WISHED YOU COULD GO TO SLEEP AND NOT WAKE UP?: NO

## 2025-02-05 ASSESSMENT — PAIN SCALES - GENERAL: PAINLEVEL_OUTOF10: 0-NO PAIN

## 2025-02-05 ASSESSMENT — PATIENT HEALTH QUESTIONNAIRE - PHQ9
SUM OF ALL RESPONSES TO PHQ9 QUESTIONS 1 AND 2: 0
1. LITTLE INTEREST OR PLEASURE IN DOING THINGS: NOT AT ALL
2. FEELING DOWN, DEPRESSED OR HOPELESS: NOT AT ALL

## 2025-02-05 NOTE — PROGRESS NOTES
[facility] SLEEP MEDICINE   FOLLOW-UP VISIT NOTE    PCP: MANUEL Arora    HISTORY OF PRESENT ILLNESS     Patient ID: Raheem Washington is a 69 y.o. male who presents for follow-up of ALEXANDRE on PAP, yearly checkup.     Patient is here alone today.  To review, patient's medical history is notable for obesity (BMI 33), HTN, atrial fibrillation, TIA, central sleep apnea, and ALEXANDRE on CPAP.      Interval History  Patient was last seen in 1/10/2024. Plan was to continue PAP and follow-up yearly.  Since last visit, patient has been using machine every night. Current mask interface being used is [employer] Nani full face mask. Per records, patient is getting supplies thru [employer]  Patient denies machine problems, perceived mask leak, air hunger, aerophagia, nasal congestion, and skin irritation. Complains of dry mouth but patient states that he is not using water in water chamber.  The following are patient's perceived benefits of PAP: decreased snoring / choking / gasping, refreshing sleep, and decreased daytime sleepiness and/or fatigue    RLS Follow-up:   denies    SLEEP STUDY HISTORY (personally reviewed raw data such as interpretation report, data sheet, hypnogram, and titration table if available and applicable)  HSAT 4/30/2023: AHI3% 76.5, AHI4% 60.9, MYRTLE 34, central and mixed apneas 56%, SpO2 abram 80%, spent 83 mins with SpO2<89%  Split night PSG 9/11/2023: RDI3% 68.9, RDI4% 52.8, MYRTLE 17.7, SpO2 abram 82%, spent 6.6 mins with SpO2<89%. During diagnostic portion, most events were obstructive. There were several mixed apneas noted with longer central apnea portion. Both mixed and central apneas were occurring during sleep-wake transitions and post-arousals. Cheyne-Crenshaw breathing pattern and ataxic breathing pattern were not present. CPAP was started at 5-12 cm H2O. At CPAP 12 cm H2O with REM supine sleep, RDI 8.4, REM RDI 8.9, supine RDI 8.4, SpO2 abram 89%    SLEEP-WAKE  SCHEDULE  Bedtime:  11 PM on weekdays, 12 MN on weekends  Subjective sleep latency: 30-60 minutes due to watching TV  Number of awakenings:  2 times per night spontaneously for unknown reasons or to shut TV off  Nocturia: No  Falls back asleep right away  Final wake time:  5-6 AM daily  Out of bed time:  5-6 AM daily  Shift work: No   Naps: no  Average sleep duration (excluding naps): 5-6 hours    SLEEP ENVIRONMENT  Sleep location: bed  Sleep status: sleeps alone  Preferred sleep position: back and side    SOCIAL HISTORY  Smoking: quit 9 years ago  ETOH: 2 beers per week  Marijuana: no  Caffeine: 2-5 cups decaf coffee daily  Sleep aids: no    WEIGHT: stable    Claustrophobia: No     REVIEW OF SYSTEMS     All other systems have been reviewed and are negative.    ALLERGIES     No Known Allergies    MEDICATIONS     Current Outpatient Medications   Medication Sig Dispense Refill    apixaban (Eliquis) 5 mg tablet Take 1 tablet (5 mg) by mouth 2 times a day. 180 tablet 1    aspirin 81 mg EC tablet Take 1 tablet (81 mg) by mouth once daily.      metoprolol tartrate (Lopressor) 50 mg tablet TAKE 1 TABLET BY MOUTH TWICE A  tablet 0    olmesartan-hydrochlorothiazide (BENIcar HCT) 40-25 mg tablet TAKE 1 TABLET BY MOUTH EVERY DAY 90 tablet 0    simvastatin (Zocor) 40 mg tablet TAKE 1 TABLET (40 MG) BY MOUTH ONCE DAILY AT BEDTIME. 90 tablet 0     No current facility-administered medications for this visit.       Active Problems, Allergy List, Medication List, and PMH/PSH/FH/Social Hx have been reviewed and reconciled in chart. No significant changes unless documented in the pertinent chart section. Updates made when necessary.       PHYSICAL EXAM     VITAL SIGNS: /87   Pulse 79   Resp 16   Wt 109 kg (241 lb 0.2 oz)   SpO2 96%   BMI 33.61 kg/m²     NECK CIRCUMFERENCE: 18.5 inches    Today ESS: 2  Last visit ESS: 0  Last visit KALEE: 13    Physical Exam  Constitutional: Awake, not in distress  Lungs: Clear to  "auscultation bilateral, no rales  Heart: Regular rate and rhythm, no murmurs  Skin: Warm, no rash  Neuro: No tremors, moves all extremities  Psych: alert and oriented to time, place, and person    RESULTS/DATA     No results found for: \"IRON\", \"TRANSFERRIN\", \"IRONSAT\", \"TIBC\", \"FERRITIN\"    Bicarbonate   Date Value Ref Range Status   04/03/2024 29 21 - 32 mmol/L Final       PAP Adherence  A PAP adherence data was obtained and reviewed personally today in clinic. (see scanned document in EPIC)      ASSESSMENT/PLAN     Raheem Washington is a 69 y.o. male who returns in [facility] for the following problems:    SEVERE SLEEP APNEA with both obstructive and central features (HSAT AHI4% 60.9, MYRTLE 34)  SLEEP-RELATED HYPOXEMIA  - Now on auto-CPAP at fixed CPAP 10 cm H2O and EPR 1 ramp only  - PAP adherence data reviewed today. Usage days 22/30, days> 4 hours at 70%, residual AHI 4.5.   - Patient reports improvement of ALEXANDRE symptoms.   - Continue current machine settings. Continue using machine every night all night.  - Renew PAP supplies. Order placed and sent to [employer].   - Continue supportive management as follows: Lose weight. Stay off your back when sleeping. Avoid smoking, alcohol, and sedating medications if applicable. Don't drive when sleepy.    OBESITY   - Recommend weight loss with diet and exercise.  - Weight loss can help in long term management of ALEXANDRE.  - Defer management to PCP.    HYPERTENSION  - BP slightly high today.   - Untreated  or inadequately treated sleep apnea can lead to new onset hypertension, resistant hypertension, or refractory hypertension.  - Continue anti-hypertensive medications per PCP.  - Supportive management: low salt DASH diet (less than 2000 mg sodium intake daily), moderate intensity aerobic exercise at least 30 minutes 5 days per week, reduce stress, quit smoking, limit alcohol, lose weight, and monitor BP once daily  - PCP following. " Defer management to PCP.    ATRIAL FIBRILLATION  s/p DCCV 3/3/2022 and PVI ablation 6/28/2022   - currently in sinus rhythm  - Continue meds per Cardio.  - Cardio following. Defer management to Cardio.    Follow-up in 1 year.    All of patient's questions were answered. He verbalizes understanding and agreement with my assessment and plan. Refer to after-visit-summary (AVS) for patient education and if applicable, for more details on sleep study preparation, troubleshooting issues with PAP usage, proper cleaning instructions of PAP supplies, and usual recommended replacement schedule for each of the PAP supplies.

## 2025-02-12 NOTE — PATIENT INSTRUCTIONS
"Thank you for coming to the Sleep Medicine Clinic today! Your sleep medicine provider today was: Elena Gupta MD Below is a summary of your treatment plan, patient education, other important information, and our contact numbers.      TREATMENT PLAN     - Continue current machine settings. Continue using machine every night all night.   - Renew PAP supplies. Order placed and sent to [employer].  - Please read the \"Patient Education\" section below for more detailed information. Try implementing tips, reminders, strategies, and supportive management.   - If not yet done, please sign up for [employer] to make a future schedule, send prescription requests, or send messages.    Follow-up Appointment:   Follow-up in 1 year.    PATIENT EDUCATION     OBSTRUCTIVE SLEEP APNEA (ALEXANDRE) is a sleep disorder where your upper airway muscles relax during sleep and the airway intermittently and repetitively narrows and collapses leading to partially blocked airway (hypopnea) or completely blocked airway (apnea) which, in turn, can disrupt breathing in sleep, lower oxygen levels while you sleep and cause night time wakings. Because both apnea and hypopnea may cause higher carbon dioxide or low oxygen levels, untreated ALEXANDRE can lead to heart arrhythmia, elevation of blood pressure, and make it harder for the body to consolidate memory and facilitate metabolism (leading to higher blood sugars at night). Frequent partial arousals occur during sleep resulting in sleep deprivation and daytime sleepiness. ALEXANDRE is associated with an increased risk of cardiovascular disease, stroke, hypertension, and insulin resistance. Moreover, untreated ALEXANDRE with excessive daytime sleepiness can increase the risk of motor vehicular accidents.    Below are conservative strategies for ALEXANDRE regardless of ALEXANDRE severity are:   Positional therapy - Avoid sleeping on your back.   Healthy diet and regular exercise to optimize weight is highly encouraged. "   Avoid alcohol late in the evening and sedative-hypnotics as these substances can make sleep apnea worse.   Improve breathing through the nose with intranasal steroid spray, saline rinse, or antihistamines    Safety: Avoid driving vehicle and operating heavy equipment while sleepy. Drowsy driving may lead to life-threatening motor vehicle accidents. A person driving while sleepy is 5 times more likely to have an accident. If you feel sleepy, pull over and take a short power nap (sleep for less than 30 minutes). Otherwise, ask somebody to drive you.    Treatment options for sleep apnea include weight management, positional therapy, Positive Airway Therapy (PAP) therapy, oral appliance therapy, hypoglossal nerve stimulator (Inspire) and select airway surgeries.    Annual Reminders About Your Sleep Apnea    Your sleep apnea is well controlled based on reviewing your PAP Data Report.     General Reminders:  Continue current machine settings. Continue using machine every night. Need at least 4 hours daily usage.   Remember to clean your mask, tubings, and water chamber regularly as instructed.   Know the replacement schedule of your supplies/ accessories and contact your DME (durable medical equipment) provider if you are due for them.   Avoid driving or operating heavy machinery when drowsy. A person driving while sleepy is 5 times more likely to have an accident. If you feel sleepy, pull over and take a short power nap (sleep for less than 30 minutes). Otherwise, ask somebody to drive you.    Follow[facility] through [facility] or calling our office if you have any of the following symptoms:  Snoring or stopping breathing while using the machine  Recurrence of fatigue, sleepiness, insomnia, and other symptoms you had prior using machine  Persistent or worsening fatigue or sleepiness despite regular use of machine  Issues tolerating the machine like bloating sensation, air hunger, too much hot air, too much pressure,  taking off mask without recall in the middle of sleep, etc.     Other conservative measures to improve sleep apnea:  Losing weight can lead to some improvement of ALEXANDRE which means you will need lower pressures in machine to control your ALEXANDRE. In some patients, they don't need to use the machine after bariatric surgery. Hence, consider medical and/or surgical weight loss especially if your BMI is more than 35.  Avoiding alcohol, sedative-hypnotics, and sedating medications is imperative as these substances can worsen snoring and sleep apnea  If you have nasal congestion or seasonal allergies, improving your breathing through the nose is critical for treating sleep apnea, tolerating CPAP, and improving your sleep; hence, using intranasal steroid spray like Flonase might help. Make sure you know the proper way to use it.  Stay off your back when sleeping.    Common issues with PAP machine:  Mask gets dislodged when turning to the side: Consider getting a CPAP pillow or switching to a mask with hose on top.     Dry mouth:  Your machine has built-in humidifier that heats up the air to prevent dry mouth. It can be adjusted to your comfort. You can try that first and increase setting one level one night at a time to check which setting is comfortable and effective in lessening dry mouth. In some patients with heated hose, adjusting tube temperature to make air warmer can improve dry mouth. If dry mouth persists despite adjusting humidity or tube temperature setting, may apply OTC Biotene gel over the gums at bedtime.  If Biotene gel is not effective, consider trying XEROSTOM gel from [employer].  Also, eliminate or reduce dose of medications that can cause dry mouth if possible. Lastly, may try getting a separate room humidifier machine.    Airleaks: Please call DME as they may need to adjust your mask or refit you with a different kind or different size of mask. In addition, you can ask DME for tips on getting a good mask  "seal and mask fit.     Difficulty tolerating the mask: Contact your DME to try a different kind of mask and/or call office to get a referral to Sleep Psychologist for CPAP desensitization. CPAP desensitization technique is a set of strategies that helps patient cope with claustrophobia and anxiety related to wearing mask. Alternatively, we can do a daytime mini-sleep study called PAP-nap trial wherein you will try on different kinds of mask and the sleep technician will try different pressure settings on CPAP and BPAP machines to see which specific pressure is tolerable and comfortable for you.     Water droplets or moisture within the hose and/or mask: This is called rain-out and it is caused by condensation of too much heated humidity on the cooler walls of the hose. If you have rain-out, turn down humidity settings or get a heated hose. If you already have a heated hose, turn up the \"tube temperature\" of the heated hose. Alternatively, if you don't want to get a heated hose or warmer air, may wrap the CPAP hose with stockings to keep it somewhat warm. Also, you need to place the machine on the floor and lower the hose so that water won't travel upward towards your mask.     Maintaining your CPAP/BPAP device:    The humidification chamber (aka water tank or water chamber) needs to be filled with distilled water to prevent buildup of white deposits in the future. If you cannot find distilled water, you can use tap water but expect to have white deposits buildup seen after prolonged use with tap water. If you start seeing white deposits on the water chamber, you can clean it by filling it with equal parts of distilled white vinegar and water. Let the vinegar-water mixture sit for 2 hours, and then rinse it with running tap water. Clean with soap and water then let it dry.     You should try to keep your machine clean in order to work well. Here are some tips to clean PAP supplies / accessories:    Clean the " humidification chamber (aka water tank) as well as your mask and tubing at least once a week with soap and water.   Alternatively, you can fill a sink or basin with warm water and add a little mild detergent, like Ivory dish soap. Gently wipe your supplies with the soapy water to free all the oils and dirt that may have collected. Once that's done, rinse these items with clean water until the soap is gone and let them air dry. You can hang your tubing over the curtain misael in your bathroom so that it dries.  The mask insert (part of the mask that has contact with your skin) needs to be cleaned with soap and water daily. Another option is to wipe them down with CPAP wipes or baby wipes.    You should replace your mask and tubing frequently in order to prevent bacteria buildup, machine damage, and mask seal issues. The older the mask and hose, the high likelihood that there is bacteria buildup in it especially if they are not cleaned regularly. Dirty filters damage machines because build-up of dust and contaminants can cause machine to over-heat, and in time, damage the motor of machine. Cushions lose their seal over time as most masks are made of plastic and silicone while headgear is made of neoprene. These materials will break down with age and frequent use. Here is the recommended replacement schedule for PAP supplies / accessories:    Twice a month- disposable filters and cushions for nasal mask or nasal pillows.  Once a month- cushion for full face mask  Every 3 months- mask with headgear and PAP tubing (standard or heated hose)  Every 6 months- reusable filter, water chamber, and chin strap     Other useful information:    Some people do not put water in the tank while other people prefers to put water in the tank to prevent mouth dryness. Try to experiment to determine which is more comfortable for you.   In general, new machines have 2 years warranty on parts while health insurance allows you to have a new  machine once every 5 years.     You can also go to the following EDUCATION WEBSITES for further information:   American Academy of Sleep Medicine http://sleepeducation.org  National Sleep Foundation: https://sleepfoundation.org  American Sleep Apnea Association: https://www.sleepapnea.org (for patients with sleep apnea)  Narcolepsy Network: https://www.narcolepsynetwork.org (for patients with narcolepsy)  [employer]: https://www.Datamcolepsy.org (for patients with narcolepsy)  Hypersomnia Foundation: https://www.hypersomniafoundation.org (for patients with idiopathic hypersomnia)  RLS foundation: https://www.rls.org (for patients with restless leg syndrome)    IMPORTANT INFORMATION     Call 911 for medical emergencies.  Our offices are generally open from Monday-Friday, 8 am - 5 pm.   There are no supporting services by either the sleep doctors or their staff on weekends and Holidays, or after 5 PM on weekdays.   If you need to get in touch with me, you may either call my office number or you can use [employer].  If a referral for a test, for CPAP, or for another specialist was made, and you have not heard about scheduling this within a week, please call scheduling at [phone] ([phone]).  If you are unable to make your appointment for clinic or an overnight study, kindly call the office or sleep testing center at least 48 hours in advance to cancel and reschedule.  If you are on CPAP, please bring your device's card and/or the device to each clinic appointment.   In case of problems with PAP machine or mask interface, please contact your [employer] (Durable Medical Equipment) company first. [employer] is the company who provides you the machine and/or PAP supplies.       PRESCRIPTIONS     We require 7 days advanced notice for prescription refills. If we do not receive the request in this time, we cannot guarantee that your medication will be refilled in time.    IMPORTANT PHONE NUMBERS     Sleep Medicine Clinic Fax:  [phone]  Appointments (for Pediatric Sleep Clinic): [phone] - option 1  Appointments (for Adult Sleep Clinic): [phone] - option 2  Appointments (For Sleep Studies): [phone] - option 3  Behavioral Sleep Medicine: [phone]  Sleep Surgery: [phone]  Nutrition Service: [phone]  Weight management clinics with endocrinology: [phone]  Bariatric Services: [phone] (includes weight loss medications and weight loss surgery)  [facility]: [phone] (offers holistic approaches to weight management)  ENT (Otolaryngology): [phone]  Headache Clinic (Neurology): [phone]  Neurology: [phone]  Psychiatry: [phone]  Pulmonary Function Testing (PFT) Center: [phone]  Pulmonary Medicine: [phone]  [employer]"): [phone]      OUR SLEEP TESTING LOCATIONS     Our team will contact you to schedule your sleep study, however, you can contact us as follow:  Main Phone Line (scheduling only): [phone]), option 3  Adult and Pediatric Locations  [facility] (6 years and older): [facility] - 4th floor ([address] [place] [address]) After hours line: [phone]  [facility] (Main campus: All ages): [facility], 6th floor. After hours line: [phone]   [place] (5 years and older; younger considered on case-by-case basis): [address], [address]. Scheduling  After hours line: [phone]   [place] (6 years and older): [address]; [address]; [address]   [place] (6 years and older): [address] [facility], [address]  After hours line: [phone]   [place] (13 years and older) in [place]: [address], 2nd floor  After hours line: [phone]   [place] (13 year and older): [address] [facility] [address]  After hours line: [phone]     Adult Only Locations:  [facility] [place] (18 years and  "older): [address] [facility], 2nd floor   [place] (18 years and older): [address] [facility]; 4th floor  After hours line: [phone]   [place] (18 years and older) at [place]: [address] [facility]  After hours line: [phone]     CONTACTING YOUR SLEEP MEDICINE PROVIDER AND SLEEP TEAM      For issues with your machine or mask interface, please call your DME provider first. DME stands for durable medical company. DME is the company who provides you the machine and/or PAP supplies / accessories.   To schedule, cancel, or reschedule SLEEP STUDY APPOINTMENTS, please call the Main Phone Line at [phone] - option 3.   To schedule, cancel, or reschedule CLINIC APPOINTMENTS, you can do it in \"MyChart\", call [phone] (direct line of [facility]), call [phone] (direct line of [facility]), or call the Main Phone Line at [phone] - option 2  For CLINICAL QUESTIONS or MEDICATION REFILLS, please call direct line for Adult Sleep Nurses at [phone].   Lastly, you can also send a message directly to your provider through \"My Chart\", which is the email service through your  Records Account: [email or web address]       Here at [facility], we wish you a restful sleep!    "

## 2025-03-10 ENCOUNTER — OFFICE VISIT (OUTPATIENT)
Dept: CARDIOLOGY | Facility: HOSPITAL | Age: 70
End: 2025-03-10
Payer: MEDICARE

## 2025-03-10 VITALS
SYSTOLIC BLOOD PRESSURE: 124 MMHG | WEIGHT: 232.37 LBS | BODY MASS INDEX: 32.41 KG/M2 | OXYGEN SATURATION: 92 % | DIASTOLIC BLOOD PRESSURE: 75 MMHG | HEART RATE: 75 BPM

## 2025-03-10 DIAGNOSIS — I48.91 ATRIAL FIBRILLATION, UNSPECIFIED TYPE (MULTI): Primary | ICD-10-CM

## 2025-03-10 DIAGNOSIS — E78.5 HYPERLIPIDEMIA, UNSPECIFIED HYPERLIPIDEMIA TYPE: ICD-10-CM

## 2025-03-10 DIAGNOSIS — I10 HYPERTENSION, UNSPECIFIED TYPE: ICD-10-CM

## 2025-03-10 DIAGNOSIS — I48.0 PAROXYSMAL ATRIAL FIBRILLATION (MULTI): ICD-10-CM

## 2025-03-10 PROCEDURE — 99214 OFFICE O/P EST MOD 30 MIN: CPT | Performed by: NURSE PRACTITIONER

## 2025-03-10 PROCEDURE — 1036F TOBACCO NON-USER: CPT | Performed by: NURSE PRACTITIONER

## 2025-03-10 PROCEDURE — 1159F MED LIST DOCD IN RCRD: CPT | Performed by: NURSE PRACTITIONER

## 2025-03-10 PROCEDURE — G2211 COMPLEX E/M VISIT ADD ON: HCPCS | Performed by: NURSE PRACTITIONER

## 2025-03-10 PROCEDURE — 3078F DIAST BP <80 MM HG: CPT | Performed by: NURSE PRACTITIONER

## 2025-03-10 PROCEDURE — 3074F SYST BP LT 130 MM HG: CPT | Performed by: NURSE PRACTITIONER

## 2025-03-10 RX ORDER — DABIGATRAN ETEXILATE 150 MG/1
150 CAPSULE ORAL 2 TIMES DAILY
Qty: 60 CAPSULE | Refills: 11 | Status: SHIPPED | OUTPATIENT
Start: 2025-03-10 | End: 2026-03-10

## 2025-03-10 ASSESSMENT — ENCOUNTER SYMPTOMS
EYES NEGATIVE: 1
MYALGIAS: 1
GASTROINTESTINAL NEGATIVE: 1
PSYCHIATRIC NEGATIVE: 1
HEMATOLOGIC/LYMPHATIC NEGATIVE: 1
NEUROLOGICAL NEGATIVE: 1
RESPIRATORY NEGATIVE: 1
ENDOCRINE NEGATIVE: 1
CARDIOVASCULAR NEGATIVE: 1
CONSTITUTIONAL NEGATIVE: 1

## 2025-03-10 NOTE — PROGRESS NOTES
Referred by Dr. Alejandra pimentel. provider found for Follow-up (1 yr.)     History Of Present Illness:    Raheem Washington is a very pleasant 69 year old gentleman with a history of HTN, HLD, CVA (2022) and atrial fibrillation s/p ablation (2022), he is here for a follow up visit. The patient is seen in collaboration with Dr. Brown. Mr. Washington denies chest pain or heart palpitations. Occasional shortness of breath, complains of a cough. Continues to stay active.       Review of Systems   Constitutional: Negative.   HENT: Negative.     Eyes: Negative.    Cardiovascular: Negative.    Respiratory: Negative.     Endocrine: Negative.    Hematologic/Lymphatic: Negative.    Skin: Negative.    Musculoskeletal:  Positive for myalgias.   Gastrointestinal: Negative.    Neurological: Negative.    Psychiatric/Behavioral: Negative.          Past Medical History:  He has a past medical history of Cervicalgia (06/14/2021) and Other specified abnormal findings of blood chemistry (06/25/2020).    Past Surgical History:  He has a past surgical history that includes Other surgical history (08/08/2017); Vasectomy (01/02/2018); and Other surgical history (01/02/2018).      Social History:  He reports that he quit smoking about 10 years ago. His smoking use included cigarettes. He started smoking about 30 years ago. He has a 20 pack-year smoking history. He has never used smokeless tobacco. He reports current alcohol use of about 5.0 standard drinks of alcohol per week. He reports that he does not use drugs.    Family History:  Family History   Problem Relation Name Age of Onset    Hyperlipidemia Mother      Hypertension Mother      Hyperlipidemia Father      Hypertension Father      Other (cardiac disorder) Father          Allergies:  Patient has no known allergies.    Outpatient Medications:  Current Outpatient Medications   Medication Instructions    apixaban (ELIQUIS) 5 mg, oral, 2 times daily    aspirin 81 mg EC tablet 1 tablet, Daily     metoprolol tartrate (LOPRESSOR) 50 mg, oral, 2 times daily    olmesartan-hydrochlorothiazide (BENIcar HCT) 40-25 mg tablet 1 tablet, oral, Daily    simvastatin (ZOCOR) 40 mg, oral, Nightly        Last Recorded Vitals:  Vitals:    03/10/25 0847   BP: 124/75   Pulse: 75   SpO2: 92%   Weight: 105 kg (232 lb 5.8 oz)         Physical Exam:  Physical Exam  Vitals reviewed.   HENT:      Head: Normocephalic.      Nose: Nose normal.   Eyes:      Pupils: Pupils are equal, round, and reactive to light.   Cardiovascular:      Rate and Rhythm: Normal rate and regular rhythm.   Pulmonary:      Effort: Pulmonary effort is normal.      Breath sounds: Normal breath sounds.   Abdominal:      General: Abdomen is flat.      Palpations: Abdomen is soft.   Musculoskeletal:         General: Normal range of motion.      Cervical back: Normal range of motion.   Skin:     General: Skin is warm and dry.   Neurological:      General: No focal deficit present.      Mental Status: He is alert and oriented to person, place, and time.   Psychiatric:         Mood and Affect: Mood normal.            Last Labs:  CBC -  Lab Results   Component Value Date    WBC 4.5 04/03/2024    HGB 16.2 04/03/2024    HCT 49.1 04/03/2024    MCV 97 04/03/2024     04/03/2024       CMP -  Lab Results   Component Value Date    CALCIUM 9.4 04/03/2024    PHOS 3.5 01/31/2022    PROT 6.8 04/03/2024    ALBUMIN 4.4 04/03/2024    AST 19 04/03/2024    ALT 27 04/03/2024    ALKPHOS 62 04/03/2024    BILITOT 0.6 04/03/2024       LIPID PANEL -   Lab Results   Component Value Date    CHOL 146 04/03/2024    TRIG 145 04/03/2024    HDL 38.1 04/03/2024    CHHDL 3.8 04/03/2024    LDLF 72 04/03/2023    VLDL 29 04/03/2024    NHDL 108 04/03/2024       RENAL FUNCTION PANEL -   Lab Results   Component Value Date    GLUCOSE 113 (H) 04/03/2024     04/03/2024    K 4.3 04/03/2024     04/03/2024    CO2 29 04/03/2024    ANIONGAP 11 04/03/2024    BUN 22 04/03/2024    CREATININE  1.06 04/03/2024    GFRMALE 78 04/03/2023    CALCIUM 9.4 04/03/2024    PHOS 3.5 01/31/2022    ALBUMIN 4.4 04/03/2024        Lab Results   Component Value Date    BNP 87 01/28/2022    HGBA1C 6.0 (A) 01/28/2022       Last Cardiology Tests:  ECG:    Echo:  ECHO: 1/28/2022  1. The left ventricular systolic function is mildly decreased with a 45-50% estimated ejection fraction.  2. Spectral Doppler shows an abnormal pattern of left ventricular diastolic filling.  3. There is mild aortic valve regurgitation.  4. There is mild mitral and tricuspid regurgitation.  5. The patient is in atrial fibrillation which may influence the estimate of left ventricular function and transvalvular flows.  6. There is no evidence of a patent foramen ovale.  Ejection Fractions:  LVEF 45-50%  Cath:    Stress Test:    Cardiac Imaging:      Assessment/Plan   Very pleasant 69 year old male with a history of tobacco dependence, HTN, HLD, CVA (2022) and afib s/p ablation (6/2022), clinically remains in sinus rhythm. He denies recurrent heart palpitations. He continues to do well from a cardiac standpoint. Heart rate and blood pressure are well controlled today.         Plan:  Continue Olmesartan/HCTZ 40-25 mg daily  Restrict dietary sodium to less than 2000 mg daily  Monitor BP at home and record  Continue Eliquis 5 mg twice daily  Continue daily aspirin 81 mg daily   Continue metoprolol 50 mg twice a day  and simvastatin 40 mg daily   Follow up in one year   Call with any questions               Jess Jose, APRN-CNP

## 2025-04-03 ENCOUNTER — OFFICE VISIT (OUTPATIENT)
Facility: CLINIC | Age: 70
End: 2025-04-03
Payer: MEDICARE

## 2025-04-03 VITALS
WEIGHT: 235 LBS | HEIGHT: 71 IN | HEART RATE: 83 BPM | SYSTOLIC BLOOD PRESSURE: 124 MMHG | OXYGEN SATURATION: 98 % | DIASTOLIC BLOOD PRESSURE: 76 MMHG | BODY MASS INDEX: 32.9 KG/M2

## 2025-04-03 DIAGNOSIS — Z87.891 STOPPED SMOKING WITH GREATER THAN 20 PACK YEAR HISTORY: ICD-10-CM

## 2025-04-03 DIAGNOSIS — Z00.00 ANNUAL PHYSICAL EXAM: ICD-10-CM

## 2025-04-03 DIAGNOSIS — Z00.00 MEDICARE ANNUAL WELLNESS VISIT, SUBSEQUENT: Primary | ICD-10-CM

## 2025-04-03 PROCEDURE — 99387 INIT PM E/M NEW PAT 65+ YRS: CPT | Performed by: STUDENT IN AN ORGANIZED HEALTH CARE EDUCATION/TRAINING PROGRAM

## 2025-04-03 PROCEDURE — 1159F MED LIST DOCD IN RCRD: CPT | Performed by: STUDENT IN AN ORGANIZED HEALTH CARE EDUCATION/TRAINING PROGRAM

## 2025-04-03 PROCEDURE — 1036F TOBACCO NON-USER: CPT | Performed by: STUDENT IN AN ORGANIZED HEALTH CARE EDUCATION/TRAINING PROGRAM

## 2025-04-03 PROCEDURE — 3074F SYST BP LT 130 MM HG: CPT | Performed by: STUDENT IN AN ORGANIZED HEALTH CARE EDUCATION/TRAINING PROGRAM

## 2025-04-03 PROCEDURE — 1170F FXNL STATUS ASSESSED: CPT | Performed by: STUDENT IN AN ORGANIZED HEALTH CARE EDUCATION/TRAINING PROGRAM

## 2025-04-03 PROCEDURE — 3078F DIAST BP <80 MM HG: CPT | Performed by: STUDENT IN AN ORGANIZED HEALTH CARE EDUCATION/TRAINING PROGRAM

## 2025-04-03 PROCEDURE — 3008F BODY MASS INDEX DOCD: CPT | Performed by: STUDENT IN AN ORGANIZED HEALTH CARE EDUCATION/TRAINING PROGRAM

## 2025-04-03 PROCEDURE — 99215 OFFICE O/P EST HI 40 MIN: CPT | Performed by: STUDENT IN AN ORGANIZED HEALTH CARE EDUCATION/TRAINING PROGRAM

## 2025-04-03 ASSESSMENT — ACTIVITIES OF DAILY LIVING (ADL)
MANAGING_FINANCES: INDEPENDENT
TAKING_MEDICATION: INDEPENDENT
BATHING: INDEPENDENT
DOING_HOUSEWORK: INDEPENDENT
GROCERY_SHOPPING: INDEPENDENT
DRESSING: INDEPENDENT

## 2025-04-03 ASSESSMENT — PATIENT HEALTH QUESTIONNAIRE - PHQ9
SUM OF ALL RESPONSES TO PHQ9 QUESTIONS 1 AND 2: 0
2. FEELING DOWN, DEPRESSED OR HOPELESS: NOT AT ALL
1. LITTLE INTEREST OR PLEASURE IN DOING THINGS: NOT AT ALL

## 2025-04-03 ASSESSMENT — ENCOUNTER SYMPTOMS
DEPRESSION: 0
LOSS OF SENSATION IN FEET: 0
OCCASIONAL FEELINGS OF UNSTEADINESS: 0

## 2025-04-03 NOTE — PROGRESS NOTES
"Subjective   Raheem Washington is a 69 y.o. male who presents today to establish care for annual health maintenance.    HPI  Medicare wellness:  Colon cancer screening: no prior, no family hx   Immunizations: Due for RSV, possibly due for prevnar 20, otherwise up to date      Sees dentist and eye doctor regularly   Diet: reports balanced  Exercise: golfing and rides dirt bike and overall very active   EtOH use: social    Non-smoker: 10 years ago - smoked for 30 years about 1 pack per day     Had annual labs done 3-4 months ago with work, reports overall normal results.  Will bring a copy to the office     Hx of CVA several years ago, no residual deficits   Following with cardiology for A. Fib   BP has been stable and well controlled on current medications       ROS:   All pertinent positive symptoms are included in the history of present illness.  All other systems have been reviewed and are negative and noncontributory to this patient's current ailments.    Objective     /76   Pulse 83   Ht 1.803 m (5' 11\")   Wt 107 kg (235 lb)   SpO2 98%   BMI 32.78 kg/m²     Physical Exam   CONSTITUTIONAL - well nourished, well developed, looks like stated age, in no acute distress, not ill-appearing, and not tired appearing  SKIN - normal skin color and pigmentation, normal skin turgor without rash, lesions, or nodules visualized  HEAD - no trauma, normocephalic  EYES - pupils are equal and reactive to light, extraocular muscles are intact, and normal external exam  ENT - TM's intact, no injection, no signs of infection, uvula midline, normal tongue movement and throat normal, no exudate  NECK - supple without rigidity, no neck mass was observed, no thyromegaly or thyroid nodules  CHEST - clear to auscultation, no wheezing, no crackles and no rales, good effort  CARDIAC - regular rate and regular rhythm, no skipped beats, no murmur  ABDOMEN - no organomegaly, soft, nontender, nondistended, normal bowel sounds, no " guarding/rebound/rigidity  EXTREMITIES - no obvious or evident edema, no obvious or evident deformities  NEUROLOGICAL - normal gait, normal balance, normal motor, no ataxia, alert, oriented and no focal signs  PSYCHIATRIC - appropriate mood and behavior     Assessment/Plan   Diagnoses and all orders for this visit:  Medicare annual wellness visit, subsequent  Comments:  Discussed and recommended age-appropriate vaccinations and screenings  Is open to Cologuard, would like to wait and consider order at next visit  Annual physical exam  Comments:  Complete physical exam performed  Annual lab work recently completed through patient's employer  Will obtain lab records to add to chart  Stopped smoking with greater than 20 pack year history  Comments:  Plan for low-dose chest CT for monitoring  Orders:  -     CT lung screening low dose; Future    Follow-up in 6 months or sooner with concerns     Kelley Swanson DO

## 2025-04-15 ENCOUNTER — APPOINTMENT (OUTPATIENT)
Dept: PRIMARY CARE | Facility: CLINIC | Age: 70
End: 2025-04-15
Payer: MEDICARE

## 2025-05-31 DIAGNOSIS — E78.5 HYPERLIPIDEMIA, UNSPECIFIED HYPERLIPIDEMIA TYPE: ICD-10-CM

## 2025-05-31 DIAGNOSIS — I10 HYPERTENSION, UNSPECIFIED TYPE: ICD-10-CM

## 2025-06-02 RX ORDER — SIMVASTATIN 40 MG/1
40 TABLET, FILM COATED ORAL NIGHTLY
Qty: 90 TABLET | Refills: 3 | Status: SHIPPED | OUTPATIENT
Start: 2025-06-02 | End: 2026-06-02

## 2025-06-02 RX ORDER — METOPROLOL TARTRATE 50 MG/1
50 TABLET ORAL 2 TIMES DAILY
Qty: 180 TABLET | Refills: 3 | Status: SHIPPED | OUTPATIENT
Start: 2025-06-02

## 2025-06-02 RX ORDER — OLMESARTAN MEDOXOMIL AND HYDROCHLOROTHIAZIDE 40/25 40; 25 MG/1; MG/1
1 TABLET ORAL DAILY
Qty: 90 TABLET | Refills: 3 | Status: SHIPPED | OUTPATIENT
Start: 2025-06-02

## 2025-08-07 ENCOUNTER — OFFICE VISIT (OUTPATIENT)
Dept: CARDIOLOGY | Facility: HOSPITAL | Age: 70
End: 2025-08-07
Payer: MEDICARE

## 2025-08-07 VITALS
WEIGHT: 230.6 LBS | RESPIRATION RATE: 16 BRPM | DIASTOLIC BLOOD PRESSURE: 88 MMHG | OXYGEN SATURATION: 95 % | HEIGHT: 71 IN | BODY MASS INDEX: 32.28 KG/M2 | HEART RATE: 66 BPM | SYSTOLIC BLOOD PRESSURE: 137 MMHG

## 2025-08-07 DIAGNOSIS — I48.91 ATRIAL FIBRILLATION, UNSPECIFIED TYPE (MULTI): ICD-10-CM

## 2025-08-07 LAB
ATRIAL RATE: 65 BPM
P AXIS: 55 DEGREES
P OFFSET: 184 MS
P ONSET: 131 MS
PR INTERVAL: 188 MS
Q ONSET: 225 MS
QRS COUNT: 10 BEATS
QRS DURATION: 86 MS
QT INTERVAL: 400 MS
QTC CALCULATION(BAZETT): 416 MS
QTC FREDERICIA: 410 MS
R AXIS: 27 DEGREES
T AXIS: 33 DEGREES
T OFFSET: 425 MS
VENTRICULAR RATE: 65 BPM

## 2025-08-07 PROCEDURE — 1160F RVW MEDS BY RX/DR IN RCRD: CPT | Performed by: NURSE PRACTITIONER

## 2025-08-07 PROCEDURE — 3075F SYST BP GE 130 - 139MM HG: CPT | Performed by: NURSE PRACTITIONER

## 2025-08-07 PROCEDURE — 99214 OFFICE O/P EST MOD 30 MIN: CPT | Performed by: NURSE PRACTITIONER

## 2025-08-07 PROCEDURE — 3008F BODY MASS INDEX DOCD: CPT | Performed by: NURSE PRACTITIONER

## 2025-08-07 PROCEDURE — 3079F DIAST BP 80-89 MM HG: CPT | Performed by: NURSE PRACTITIONER

## 2025-08-07 PROCEDURE — 99212 OFFICE O/P EST SF 10 MIN: CPT

## 2025-08-07 PROCEDURE — 93005 ELECTROCARDIOGRAM TRACING: CPT | Performed by: NURSE PRACTITIONER

## 2025-08-07 PROCEDURE — 1159F MED LIST DOCD IN RCRD: CPT | Performed by: NURSE PRACTITIONER

## 2025-08-07 ASSESSMENT — ENCOUNTER SYMPTOMS
ABDOMINAL PAIN: 0
FEVER: 0
NAUSEA: 0
SHORTNESS OF BREATH: 0
VOMITING: 0
DYSPNEA ON EXERTION: 0
MYALGIAS: 0
PND: 0
PALPITATIONS: 0
SYNCOPE: 0
SNORING: 0
HEMOPTYSIS: 0
DIAPHORESIS: 0
ORTHOPNEA: 0
FALLS: 0
LIGHT-HEADEDNESS: 0
DIZZINESS: 0
DOUBLE VISION: 0
HEADACHES: 0
SORE THROAT: 0
DIARRHEA: 0
IRREGULAR HEARTBEAT: 0
WEAKNESS: 0
BLURRED VISION: 0
NEAR-SYNCOPE: 0
SPUTUM PRODUCTION: 0
COUGH: 0

## 2025-08-07 ASSESSMENT — PATIENT HEALTH QUESTIONNAIRE - PHQ9
1. LITTLE INTEREST OR PLEASURE IN DOING THINGS: NOT AT ALL
2. FEELING DOWN, DEPRESSED OR HOPELESS: NOT AT ALL
SUM OF ALL RESPONSES TO PHQ9 QUESTIONS 1 AND 2: 0

## 2025-08-07 NOTE — PROGRESS NOTES
"Subjective   Raheem Washington is a 69 y.o. male.    Chief Complaint:  Atrial Fibrillation    Raheem Washington is a 68 y.o. year old male patient with     1. Hypertension- slightly elevated today     2. Stroke January 2022. Received TPA. No residual effects from stroke     3. Atrial fibrillation- first diagnosed end of January 2022 when patient presented with stroke. TPA given. Echocardiogram at that time showed LV function 45-50% with normal left atrium. Patient underwent DCCV 3/3/2022 though thinks he went back into AF a few days later. ECG on 3/20 showed AF. Patient s/p PVI ablation 6/28/2022.     ECG 8/8/2024 NSR HR 72 bpm  ECG 8/7/2025 NSR HR 65 bpm    TODAY Patient presents for annual follow up of Afib. Since our last visit, patient was switched from Eliquis to Pradaxa. This is much more cost effective for him.  He has no cardiac complaints today.    /88 (BP Location: Left arm)   Pulse 66   Resp 16   Ht 1.803 m (5' 11\")   Wt 105 kg (230 lb 9.6 oz)   SpO2 95%   BMI 32.16 kg/m²   Current Outpatient Medications on File Prior to Visit   Medication Sig Dispense Refill    aspirin 81 mg EC tablet Take 1 tablet (81 mg) by mouth once daily.      dabigatran etexilate (Pradaxa) 150 mg capsule Take 1 capsule (150 mg) by mouth 2 times a day. Do not crush or chew. 60 capsule 11    metoprolol tartrate (Lopressor) 50 mg tablet TAKE 1 TABLET BY MOUTH TWICE A  tablet 3    olmesartan-hydrochlorothiazide (BENIcar HCT) 40-25 mg tablet TAKE 1 TABLET BY MOUTH EVERY DAY 90 tablet 3    simvastatin (Zocor) 40 mg tablet TAKE 1 TABLET (40 MG) BY MOUTH ONCE DAILY AT BEDTIME. 90 tablet 3    [DISCONTINUED] apixaban (Eliquis) 5 mg tablet Take 1 tablet (5 mg) by mouth 2 times a day. 180 tablet 1     No current facility-administered medications on file prior to visit.         Review of Systems   Constitutional: Negative for diaphoresis, fever and malaise/fatigue.   HENT:  Negative for congestion and sore throat.    Eyes:  " Negative for blurred vision and double vision.   Cardiovascular:  Negative for chest pain, dyspnea on exertion, irregular heartbeat, leg swelling, near-syncope, orthopnea, palpitations, paroxysmal nocturnal dyspnea and syncope.   Respiratory:  Negative for cough, hemoptysis, shortness of breath, snoring and sputum production.    Hematologic/Lymphatic: Negative for bleeding problem.   Skin:  Negative for rash.   Musculoskeletal:  Negative for falls, joint pain and myalgias.   Gastrointestinal:  Negative for abdominal pain, diarrhea, nausea and vomiting.   Neurological:  Negative for dizziness, headaches, light-headedness and weakness.   All other systems reviewed and are negative.      Objective   Constitutional:       Appearance: Healthy appearance. Not in distress.   Eyes:      Conjunctiva/sclera: Conjunctivae normal.   HENT:      Nose: Nose normal.    Mouth/Throat:      Pharynx: Oropharynx is clear.   Pulmonary:      Effort: Pulmonary effort is normal.      Breath sounds: Normal breath sounds. No wheezing. No rhonchi.   Chest:      Chest wall: Not tender to palpatation.   Cardiovascular:      Normal rate. Regular rhythm.      Murmurs: There is no murmur.      No rub.   Pulses:     Intact distal pulses.   Edema:     Peripheral edema absent.   Abdominal:      General: Bowel sounds are normal.      Palpations: Abdomen is soft.   Musculoskeletal: Normal range of motion.      Cervical back: Neck supple. Skin:     General: Skin is warm and dry.   Neurological:      Mental Status: Alert and oriented to person, place and time.      Motor: Motor function is intact.       Lab Review:   Lab Results   Component Value Date     04/03/2024    K 4.3 04/03/2024     04/03/2024    CO2 29 04/03/2024    BUN 22 04/03/2024    CREATININE 1.06 04/03/2024    GLUCOSE 113 (H) 04/03/2024    CALCIUM 9.4 04/03/2024     Lab Results   Component Value Date    WBC 4.5 04/03/2024    HGB 16.2 04/03/2024    HCT 49.1 04/03/2024    MCV 97  04/03/2024     04/03/2024     I personally reviewed the patient's latest ECG, echocardiogram and stress test.  Results are within normal limits  I personally reviewed the patient's latest PCP, general cardiology and ED notes.    Assessment/Plan   The encounter diagnosis was Atrial fibrillation, unspecified type (Multi).  Patient presents today for annual follow-up of his atrial fibrillation.  He is now 3 years post ablation and maintaining normal sinus rhythm.  Denies any further cardiac complaints.    His [id number] score is 4, so would likely benefit from indefinite anticoagulation.  He is tolerating Pradaxa without any bleeding issues.    Continue metoprolol and Pradaxa  Continue blood pressure medication and sleep apnea treatment  Follow-up with me in 1 year  Follow-up with general cardiology as scheduled.

## 2025-10-02 ENCOUNTER — APPOINTMENT (OUTPATIENT)
Facility: CLINIC | Age: 70
End: 2025-10-02
Payer: MEDICARE